# Patient Record
Sex: FEMALE | Race: WHITE | NOT HISPANIC OR LATINO | Employment: UNEMPLOYED | URBAN - METROPOLITAN AREA
[De-identification: names, ages, dates, MRNs, and addresses within clinical notes are randomized per-mention and may not be internally consistent; named-entity substitution may affect disease eponyms.]

---

## 2023-02-23 LAB
ABO GROUP (TYPE) IN BLOOD: NORMAL
ANTIBODY SCREEN: NORMAL
ESTIMATED AVERAGE GLUCOSE FOR HBA1C: NORMAL
HEMOGLOBIN A1C/HEMOGLOBIN TOTAL IN BLOOD: NORMAL
HEPATITIS B VIRUS SURFACE AG PRESENCE IN SERUM: NONREACTIVE
HEPATITIS C VIRUS AB PRESENCE IN SERUM: NONREACTIVE
HGB A1C: NORMAL %
HIV 1/ 2 AG/AB SCREEN: NONREACTIVE
RH FACTOR: NORMAL
RUBELLA VIRUS IGG AB: NORMAL
SYPHILIS TOTAL AB: NONREACTIVE
THYROTROPIN (MIU/L) IN SER/PLAS BY DETECTION LIMIT <= 0.05 MIU/L: 3.65 MIU/L (ref 0.44–3.98)
VARICELLA ZOSTER IGG: NEGATIVE

## 2023-02-24 LAB
CHLAMYDIA TRACH., AMPLIFIED: NEGATIVE
N. GONORRHEA, AMPLIFIED: NEGATIVE

## 2023-03-01 LAB
ESTIMATED AVERAGE GLUCOSE FOR HBA1C: 100 MG/DL
HEMOGLOBIN A1C/HEMOGLOBIN TOTAL IN BLOOD: 5.1 %

## 2023-03-14 LAB
ABO GROUP (TYPE) IN BLOOD: NORMAL
ANTIBODY SCREEN: NORMAL
CHORIOGONADOTROPIN (MIU/ML) IN SER/PLAS: <3 IU/L
ERYTHROCYTE DISTRIBUTION WIDTH (RATIO) BY AUTOMATED COUNT: 14.1 % (ref 11.5–14.5)
ERYTHROCYTE MEAN CORPUSCULAR HEMOGLOBIN CONCENTRATION (G/DL) BY AUTOMATED: 30.2 G/DL (ref 32–36)
ERYTHROCYTE MEAN CORPUSCULAR VOLUME (FL) BY AUTOMATED COUNT: 77 FL (ref 80–100)
ERYTHROCYTES (10*6/UL) IN BLOOD BY AUTOMATED COUNT: 4.91 X10E12/L (ref 4–5.2)
HEMATOCRIT (%) IN BLOOD BY AUTOMATED COUNT: 37.7 % (ref 36–46)
HEMOGLOBIN (G/DL) IN BLOOD: 11.4 G/DL (ref 12–16)
LEUKOCYTES (10*3/UL) IN BLOOD BY AUTOMATED COUNT: 4.8 X10E9/L (ref 4.4–11.3)
NRBC (PER 100 WBCS) BY AUTOMATED COUNT: 0 /100 WBC (ref 0–0)
PLATELETS (10*3/UL) IN BLOOD AUTOMATED COUNT: 184 X10E9/L (ref 150–450)
RH FACTOR: NORMAL

## 2023-04-18 LAB
ABO GROUP (TYPE) IN BLOOD: NORMAL
ANTIBODY SCREEN: NORMAL
CHORIOGONADOTROPIN (MIU/ML) IN SER/PLAS: <3 IU/L
ERYTHROCYTE DISTRIBUTION WIDTH (RATIO) BY AUTOMATED COUNT: 13.9 % (ref 11.5–14.5)
ERYTHROCYTE MEAN CORPUSCULAR HEMOGLOBIN CONCENTRATION (G/DL) BY AUTOMATED: 31 G/DL (ref 32–36)
ERYTHROCYTE MEAN CORPUSCULAR VOLUME (FL) BY AUTOMATED COUNT: 77 FL (ref 80–100)
ERYTHROCYTES (10*6/UL) IN BLOOD BY AUTOMATED COUNT: 5.09 X10E12/L (ref 4–5.2)
HEMATOCRIT (%) IN BLOOD BY AUTOMATED COUNT: 39 % (ref 36–46)
HEMOGLOBIN (G/DL) IN BLOOD: 12.1 G/DL (ref 12–16)
LEUKOCYTES (10*3/UL) IN BLOOD BY AUTOMATED COUNT: 4.4 X10E9/L (ref 4.4–11.3)
NRBC (PER 100 WBCS) BY AUTOMATED COUNT: 0 /100 WBC (ref 0–0)
PLATELETS (10*3/UL) IN BLOOD AUTOMATED COUNT: 201 X10E9/L (ref 150–450)
RH FACTOR: NORMAL

## 2023-04-20 ENCOUNTER — HOSPITAL ENCOUNTER (OUTPATIENT)
Dept: DATA CONVERSION | Facility: HOSPITAL | Age: 28
End: 2023-04-20
Attending: OBSTETRICS & GYNECOLOGY | Admitting: OBSTETRICS & GYNECOLOGY
Payer: COMMERCIAL

## 2023-04-20 DIAGNOSIS — Z90.710 ACQUIRED ABSENCE OF BOTH CERVIX AND UTERUS: ICD-10-CM

## 2023-04-20 DIAGNOSIS — Z87.891 PERSONAL HISTORY OF NICOTINE DEPENDENCE: ICD-10-CM

## 2023-04-20 DIAGNOSIS — N70.11 CHRONIC SALPINGITIS: ICD-10-CM

## 2023-04-20 DIAGNOSIS — Z88.0 ALLERGY STATUS TO PENICILLIN: ICD-10-CM

## 2023-05-08 LAB
THYROPEROXIDASE AB (IU/ML) IN SER/PLAS: <28 IU/ML
THYROTROPIN (MIU/L) IN SER/PLAS BY DETECTION LIMIT <= 0.05 MIU/L: 1.18 MIU/L (ref 0.44–3.98)

## 2023-05-26 LAB
COMPLETE PATHOLOGY REPORT: NORMAL
CONVERTED CLINICAL DIAGNOSIS-HISTORY: NORMAL
CONVERTED FINAL DIAGNOSIS: NORMAL
CONVERTED FINAL REPORT PDF LINK TO COPY AND PASTE: NORMAL
CONVERTED GROSS DESCRIPTION: NORMAL

## 2023-06-02 LAB
ESTRADIOL (PG/ML) IN SER/PLAS: 352 PG/ML
PROGESTERONE (NG/ML) IN SER/PLAS: 0.9 NG/ML

## 2023-06-09 LAB — PROGESTERONE (NG/ML) IN SER/PLAS: 17.3 NG/ML

## 2023-06-19 LAB — CHORIOGONADOTROPIN (MIU/ML) IN SER/PLAS: 364 IU/L

## 2023-06-21 LAB — CHORIOGONADOTROPIN (MIU/ML) IN SER/PLAS: 857 IU/L

## 2023-06-28 LAB — CHORIOGONADOTROPIN (MIU/ML) IN SER/PLAS: ABNORMAL IU/L

## 2023-06-29 LAB — THYROTROPIN (MIU/L) IN SER/PLAS BY DETECTION LIMIT <= 0.05 MIU/L: 2.13 MIU/L (ref 0.44–3.98)

## 2023-07-20 LAB
ABO GROUP (TYPE) IN BLOOD: NORMAL
ANTIBODY SCREEN: NORMAL
ERYTHROCYTE DISTRIBUTION WIDTH (RATIO) BY AUTOMATED COUNT: 14.7 % (ref 11.5–14.5)
ERYTHROCYTE MEAN CORPUSCULAR HEMOGLOBIN CONCENTRATION (G/DL) BY AUTOMATED: 31.6 G/DL (ref 32–36)
ERYTHROCYTE MEAN CORPUSCULAR VOLUME (FL) BY AUTOMATED COUNT: 79 FL (ref 80–100)
ERYTHROCYTES (10*6/UL) IN BLOOD BY AUTOMATED COUNT: 4.98 X10E12/L (ref 4–5.2)
HEMATOCRIT (%) IN BLOOD BY AUTOMATED COUNT: 39.5 % (ref 36–46)
HEMOGLOBIN (G/DL) IN BLOOD: 12.5 G/DL (ref 12–16)
LEUKOCYTES (10*3/UL) IN BLOOD BY AUTOMATED COUNT: 7.8 X10E9/L (ref 4.4–11.3)
PLATELETS (10*3/UL) IN BLOOD AUTOMATED COUNT: 199 X10E9/L (ref 150–450)
REFLEX ADDED, ANEMIA PANEL: ABNORMAL
RH FACTOR: NORMAL

## 2023-07-21 LAB
ESTIMATED AVERAGE GLUCOSE FOR HBA1C: 94 MG/DL
HEMOGLOBIN A1C/HEMOGLOBIN TOTAL IN BLOOD: 4.9 %
HEPATITIS B VIRUS SURFACE AG PRESENCE IN SERUM: NONREACTIVE
HEPATITIS C VIRUS AB PRESENCE IN SERUM: NONREACTIVE
HIV 1/ 2 AG/AB SCREEN: NONREACTIVE
RUBELLA VIRUS IGG AB: NORMAL
SYPHILIS TOTAL AB: NONREACTIVE

## 2023-08-03 LAB
ALANINE AMINOTRANSFERASE (SGPT) (U/L) IN SER/PLAS: 36 U/L (ref 7–45)
ASPARTATE AMINOTRANSFERASE (SGOT) (U/L) IN SER/PLAS: 19 U/L (ref 9–39)
CREATININE (MG/DL) IN SER/PLAS: 0.6 MG/DL (ref 0.5–1.05)
ERYTHROCYTE DISTRIBUTION WIDTH (RATIO) BY AUTOMATED COUNT: 14.8 % (ref 11.5–14.5)
ERYTHROCYTE MEAN CORPUSCULAR HEMOGLOBIN CONCENTRATION (G/DL) BY AUTOMATED: 31.6 G/DL (ref 32–36)
ERYTHROCYTE MEAN CORPUSCULAR VOLUME (FL) BY AUTOMATED COUNT: 79 FL (ref 80–100)
ERYTHROCYTES (10*6/UL) IN BLOOD BY AUTOMATED COUNT: 4.99 X10E12/L (ref 4–5.2)
GFR FEMALE: >90 ML/MIN/1.73M2
HEMATOCRIT (%) IN BLOOD BY AUTOMATED COUNT: 39.5 % (ref 36–46)
HEMOGLOBIN (G/DL) IN BLOOD: 12.5 G/DL (ref 12–16)
LACTATE DEHYDROGENASE (U/L) IN SER/PLAS BY LAC->PYR RXN: 215 U/L (ref 84–246)
LEUKOCYTES (10*3/UL) IN BLOOD BY AUTOMATED COUNT: 6.4 X10E9/L (ref 4.4–11.3)
PLATELETS (10*3/UL) IN BLOOD AUTOMATED COUNT: 183 X10E9/L (ref 150–450)
URATE (MG/DL) IN SER/PLAS: 3.8 MG/DL (ref 2.3–6.7)

## 2023-08-04 LAB
CREATININE (MG/DL) IN URINE: 214 MG/DL (ref 20–320)
PROTEIN (MG/DL) IN URINE: 15 MG/DL (ref 5–24)
PROTEIN/CREATININE (MG/MG) IN URINE: 0.07 MG/MG CREAT (ref 0–0.17)

## 2023-08-05 LAB
CHLAMYDIA TRACH., AMPLIFIED: NEGATIVE
N. GONORRHEA, AMPLIFIED: NEGATIVE

## 2023-08-06 LAB — URINE CULTURE: NO GROWTH

## 2023-09-07 VITALS — HEIGHT: 63 IN | BODY MASS INDEX: 31.05 KG/M2 | WEIGHT: 175.27 LBS

## 2023-10-02 NOTE — OP NOTE
Post Operative Note:     PreOp Diagnosis: bilateral hydrosalpinges   Post-Procedure Diagnosis: same   Procedure: 1. laparoscopic bilateral salpingectomy,  removal of bilateral hydrosalpinges  2. Hysteroscopy   Surgeon: Dr. Phillip   Resident/Fellow/Other Assistant: [email or web address]; Ara  PGY4; Saulo MS3   Anesthesia: general   I.V. Fluids: 800   Estimated Blood Loss (mL): 10   Specimen: yes. bilateral fallopian tubes   Complications: none   Findings: abnormal appearing inflamed bilateral fallopian  tubes. Normal appearing endometrial cavity. Normal appearing liver edge and appendix   Patient Returned To/Condition: Pacu / Stable   Urine Output: 500     Operative Report Dictated:  Dictation: not applicable - note contains Operative  Report   Operative Report:    SURGICAL DETAILS  The patient was taken to the operating room where general anesthesia was found be adequate. She was prepped and draped in a sterile fashion after placement in the dorsal lithotomy position. A Frye catheter was inserted into the patient's bladder and  a SG uterine manipulator was inserted. Attention was then turned to the patient's abdomen.     A small infraumbilical skin incision was made with a scalpel after injection with 0.25% Marcaine. Following this, the 5-mm optical trocar was used to introduce the laparoscope. Two additional 5mm laparoscopic lower lateral  trocars were then introduced  under direct visualization with no complications. Following this, an intra-abdominal survey was performed with the findings noted above.     The left tube and ovary appeared adherent to the left pelvic sidewall and partially retroperitonealized, Left fallopian tube appeared dilated and inflamed. The ovary appeared involved with small amount of filmy adhesions. The right fallopian tube appeared  inflamed and dilated. The cul-de-sac was free of disease. There was a normal-appearing uterus.    The LigaSure was used to carefully remove the left and  right fallopian tubes. Which were then removed through the trocar sites. The abdomen was irrigated and excellent hemostasis was confirmed.    The abdomen was desufflated and the skin incisions were repaired using 4-0 vicryl. Dermabond was applied.     At this time we proceeded with hysteroscopy:     A speculum was placed into the vagina and the cervix was visualized. The anterior lip of the cervix was grasped with a single-tooth tenaculum. The  cervical os was then serially dilated to accommodate the operative hysteroscope, which was placed through the cervical canal into the uterus. The ostia were visualized bilaterally.     A survey of the uterine cavity was performed and was notable for normal appearing endometrial cavity.    The hysteroscope and the single-tooth tenaculum were removed. Excellent hemostasis was achieved and the speculum was removed.    The patient tolerated the procedure well. All sponge, lap, and needle counts were correct x2 at the end of procedure. She returned to the recovery room in stable condition. She was expected to go home later today.    Dr. Phillip, the attending of record, was scrubbed and performed the entire procedure with the assistance of the resident/fellow.     Attestation:   Note Completion:  I am a: Resident/Fellow   Attending Attestation I was present for the entire procedure          Electronic Signatures:  Fred Phillip)  (Signed 04-May-2023 17:11)   Authored: Post Operative Note, Note Completion   Co-Signer: Post Operative Note, Note Completion  Jenn Rodriges (Fellow))  (Signed 20-Apr-2023 13:14)   Authored: Post Operative Note, Note Completion      Last Updated: 04-May-2023 17:11 by Fred Phillip)

## 2023-10-03 ENCOUNTER — ANCILLARY PROCEDURE (OUTPATIENT)
Dept: RADIOLOGY | Facility: CLINIC | Age: 28
End: 2023-10-03
Payer: COMMERCIAL

## 2023-10-03 DIAGNOSIS — O44.20: ICD-10-CM

## 2023-10-03 DIAGNOSIS — Z34.90 PREGNANT (HHS-HCC): ICD-10-CM

## 2023-10-03 PROCEDURE — 76817 TRANSVAGINAL US OBSTETRIC: CPT | Performed by: OBSTETRICS & GYNECOLOGY

## 2023-10-03 PROCEDURE — 76811 OB US DETAILED SNGL FETUS: CPT

## 2023-10-03 PROCEDURE — 76811 OB US DETAILED SNGL FETUS: CPT | Performed by: OBSTETRICS & GYNECOLOGY

## 2023-10-03 PROCEDURE — 76817 TRANSVAGINAL US OBSTETRIC: CPT

## 2023-10-13 VITALS — DIASTOLIC BLOOD PRESSURE: 68 MMHG | BODY MASS INDEX: 29.58 KG/M2 | SYSTOLIC BLOOD PRESSURE: 108 MMHG | WEIGHT: 167 LBS

## 2023-10-13 PROBLEM — N98.1 OVARIAN HYPERSTIMULATION SYNDROME: Status: ACTIVE | Noted: 2023-10-13

## 2023-10-13 PROBLEM — L30.9 DERMATITIS, UNSPECIFIED: Status: ACTIVE | Noted: 2023-04-06

## 2023-10-13 PROBLEM — L85.3 XEROSIS CUTIS: Status: ACTIVE | Noted: 2023-04-06

## 2023-10-13 PROBLEM — O24.410: Status: ACTIVE | Noted: 2023-10-13

## 2023-10-13 PROBLEM — E55.9 VITAMIN D DEFICIENCY: Status: ACTIVE | Noted: 2023-10-13

## 2023-10-13 PROBLEM — R11.2 NAUSEA AND VOMITING: Status: ACTIVE | Noted: 2023-10-13

## 2023-10-13 PROBLEM — N63.15 BREAST LUMP ON RIGHT SIDE AT 9 O'CLOCK POSITION: Status: ACTIVE | Noted: 2023-10-13

## 2023-10-13 PROBLEM — L01.09 OTHER IMPETIGO: Status: ACTIVE | Noted: 2023-04-06

## 2023-10-13 PROBLEM — N97.1: Status: ACTIVE | Noted: 2023-10-13

## 2023-10-13 PROBLEM — K21.9 GERD (GASTROESOPHAGEAL REFLUX DISEASE): Status: ACTIVE | Noted: 2023-10-13

## 2023-10-13 PROBLEM — N97.9 FEMALE INFERTILITY: Status: ACTIVE | Noted: 2023-10-13

## 2023-10-13 RX ORDER — LEVOTHYROXINE SODIUM 25 UG/1
25 TABLET ORAL DAILY
COMMUNITY

## 2023-10-13 NOTE — PROGRESS NOTES
having some cramping    ENOB: Patient feeling well overall. Reports nausea which is manageable. No vomiting. Reports some pelvic cramping, no vaginal bleeding.   IVF pregnancy. ET on 06/09/2023. Given JACINTO 02/25/2024.   Prenatal labs completed. Rubella equivocal, discussed MMR during postpartum. Labs otherwise normal.   Pap is up to date. Last pap was in 02/2021 and was negative. GC/CT and urine cultures today.  Genetic screening options discussed, patient desires NIPT and first trimester US.    H/O GDM: Early HgbA1C was normal.   H/O GHTN: Early PEC labs, UPCr today.  US: S=D, +FCA and +FM.     Routine prenatal care.   Oriented to practice, delivery at [facility], rotating appointments with providers.  GC/CT and urine cultures collected.  PEC labs, UPCr, Prequel ordered.  MAC imaging for first screen ordered.   Precautions given. -Tonia Owen 08/03/2023 12:21 PM

## 2023-10-26 ENCOUNTER — ROUTINE PRENATAL (OUTPATIENT)
Dept: OBSTETRICS AND GYNECOLOGY | Facility: CLINIC | Age: 28
End: 2023-10-26
Payer: COMMERCIAL

## 2023-10-26 VITALS — DIASTOLIC BLOOD PRESSURE: 62 MMHG | WEIGHT: 168 LBS | SYSTOLIC BLOOD PRESSURE: 100 MMHG | BODY MASS INDEX: 29.76 KG/M2

## 2023-10-26 DIAGNOSIS — Z86.32 HISTORY OF GESTATIONAL DIABETES: ICD-10-CM

## 2023-10-26 DIAGNOSIS — Z34.82 PRENATAL CARE, SUBSEQUENT PREGNANCY, SECOND TRIMESTER (HHS-HCC): Primary | ICD-10-CM

## 2023-10-26 DIAGNOSIS — Z87.59 HISTORY OF GESTATIONAL HYPERTENSION: ICD-10-CM

## 2023-10-26 DIAGNOSIS — Z78.9 CONCEIVED BY IN VITRO FERTILIZATION: ICD-10-CM

## 2023-10-26 DIAGNOSIS — Z72.0 TOBACCO USE: ICD-10-CM

## 2023-10-26 LAB
POC APPEARANCE, URINE: CLEAR
POC BILIRUBIN, URINE: NEGATIVE
POC BLOOD, URINE: NEGATIVE
POC COLOR, URINE: YELLOW
POC GLUCOSE, URINE: NEGATIVE MG/DL
POC KETONES, URINE: NEGATIVE MG/DL
POC LEUKOCYTES, URINE: NEGATIVE
POC NITRITE,URINE: NEGATIVE
POC PH, URINE: 7 PH
POC PROTEIN, URINE: NEGATIVE MG/DL
POC SPECIFIC GRAVITY, URINE: 1.01
POC UROBILINOGEN, URINE: 0.2 EU/DL

## 2023-10-26 PROCEDURE — 81003 URINALYSIS AUTO W/O SCOPE: CPT | Performed by: OBSTETRICS & GYNECOLOGY

## 2023-10-26 PROCEDURE — 0501F PRENATAL FLOW SHEET: CPT | Performed by: OBSTETRICS & GYNECOLOGY

## 2023-10-26 NOTE — PATIENT INSTRUCTIONS
You were seen in the office today for routine OB care and had normal findings on exam  Continue routine OB precautions at home  Continue taking prenatal vitamins   Avoid sick contacts and consider getting your Flu (available in office during flu season) and COVID vaccines to protect against infection in pregnancy  You will be given an order for your anatomy ultrasound. Please schedule at [facility] OB imaging between 19 and 22 weeks gestation [phone]  You will be given a bottle of Glucola and orders for your second trimester labs. Please complete these between 25 and 28 weeks gestation. You may complete these at any  outpatient lab, and do not need an appointment  Make an appointment for routine care in the office in the next 4 weeks  If you are having any concerns prior to your next visit please call the office to speak to the physician on call. This includes after hours, weekends, and holidays, when the answering service will be able to connect you with the physician on call. [phone] ([facility]) or [phone] [facility].

## 2023-10-26 NOTE — PROGRESS NOTES
Subjective   Patient ID [id number]   Vonda Francis is a 28 y.o.  at 22w4d with a working estimated date of delivery of 2024, by Ultrasound who presents for a routine prenatal visit. She denies vaginal bleeding, leakage of fluid, decreased fetal movements, or contractions.    Her pregnancy is complicated by:  IVF conception  Hx GHTN  Hx of GDM  Low lying placenta    Objective   Physical Exam  Weight: 76.2 kg (168 lb)  Expected Total Weight Gain: 5 kg (11 lb)-9 kg (19 lb)   Pregravid BMI: 30.12  BP: 100/62         Prenatal Labs  Urine dip:  Lab Results   Component Value Date    KETONESU NEGATIVE 2019       Lab Results   Component Value Date    HGB 12.5 2023    HCT 39.5 2023    ABO CANCELED 2023    ABO O 2023    HEPBSAG NONREACTIVE 2023       Imaging: reviewed recent anatomy US          Assessment/Plan   Problem List Items Addressed This Visit    None  Visit Diagnoses         Codes    Prenatal care, subsequent pregnancy, second trimester    -  Primary Z34.82    Patient doing well  Second trimester orders given  RTO 4 weeks     Relevant Orders    POC Urine Dip    Glucose, 1 Hour Screen, Pregnancy    CBC Anemia Panel With Reflex, Pregnancy    Tobacco use     Z72.0    Quit     History of gestational diabetes     Z86.32    Normal baseline A1c     History of gestational hypertension     Z87.59    Normal HELLP labs/UPCr. Patient on ASA     Conceived by in vitro fertilization     Z78.9    Serial growth US  NST at 36 weeks             Continue prenatal vitamin.  Labs reviewed.  Rhogam not indicated  GTT orders given.    Follow up in 4 weeks for a routine prenatal visit.  Eneida Joe DO

## 2023-10-27 ENCOUNTER — ROUTINE PRENATAL (OUTPATIENT)
Dept: OBSTETRICS AND GYNECOLOGY | Facility: CLINIC | Age: 28
End: 2023-10-27
Payer: COMMERCIAL

## 2023-10-27 VITALS — WEIGHT: 169 LBS | DIASTOLIC BLOOD PRESSURE: 70 MMHG | SYSTOLIC BLOOD PRESSURE: 100 MMHG | BODY MASS INDEX: 29.94 KG/M2

## 2023-10-27 DIAGNOSIS — O46.8X2 SUBCHORIONIC HEMATOMA IN SECOND TRIMESTER, SINGLE OR UNSPECIFIED FETUS (HHS-HCC): Primary | ICD-10-CM

## 2023-10-27 DIAGNOSIS — Z3A.22 22 WEEKS GESTATION OF PREGNANCY (HHS-HCC): ICD-10-CM

## 2023-10-27 DIAGNOSIS — O44.02 PLACENTA PREVIA IN SECOND TRIMESTER (HHS-HCC): ICD-10-CM

## 2023-10-27 DIAGNOSIS — O41.8X20 SUBCHORIONIC HEMATOMA IN SECOND TRIMESTER, SINGLE OR UNSPECIFIED FETUS (HHS-HCC): Primary | ICD-10-CM

## 2023-10-27 LAB
POC APPEARANCE, URINE: ABNORMAL
POC BILIRUBIN, URINE: NEGATIVE
POC BLOOD, URINE: ABNORMAL
POC COLOR, URINE: ABNORMAL
POC GLUCOSE, URINE: NEGATIVE MG/DL
POC KETONES, URINE: NEGATIVE MG/DL
POC LEUKOCYTES, URINE: NEGATIVE
POC NITRITE,URINE: NEGATIVE
POC PH, URINE: 5 PH
POC PROTEIN, URINE: ABNORMAL MG/DL
POC SPECIFIC GRAVITY, URINE: 1.02
POC UROBILINOGEN, URINE: 0.2 EU/DL

## 2023-10-27 PROCEDURE — 99213 OFFICE O/P EST LOW 20 MIN: CPT | Performed by: OBSTETRICS & GYNECOLOGY

## 2023-10-27 PROCEDURE — 76815 OB US LIMITED FETUS(S): CPT | Performed by: OBSTETRICS & GYNECOLOGY

## 2023-10-27 PROCEDURE — 81003 URINALYSIS AUTO W/O SCOPE: CPT | Performed by: OBSTETRICS & GYNECOLOGY

## 2023-10-27 NOTE — PROGRESS NOTES
Subjective   Patient ID [id number]   Vonda Francis is a 28 y.o.  at 22w5d with a working estimated date of delivery of 2024, by Ultrasound who presents for complaint of vaginal bleeding. She states at 0900 today she was walking this morning and noted a sudden wetness in her underwear. She went to the restroom and noted dark red blood in her underwear. She states since that time the bleeding has stopped. She has not felt movement today. She denies vaginal or abdominal pain, notes occasional mild cramping last night. She denies falls/trauma. She has not had bleeding prior to this pregnancy.    Her pregnancy is complicated by:  IVF conception  Hx GDM  Hx GHTN  Low lying placenta on US    Objective   Physical Exam  Weight: 76.7 kg (169 lb)  Expected Total Weight Gain: 5 kg (11 lb)-9 kg (19 lb)   Pregravid BMI: 30.12  BP: 100/70         Prenatal Labs  Urine dip:  Lab Results   Component Value Date    KETONESU NEGATIVE 10/27/2023       Lab Results   Component Value Date    HGB 12.5 2023    HCT 39.5 2023    ABO CANCELED 2023    ABO O 2023    HEPBSAG NONREACTIVE 2023       Imaging:  CL 4 cm, 2.45 cm x 3.48 cm subchorionic hematoma along inferior placental edge.    Assessment/Plan   Problem List Items Addressed This Visit    None  Visit Diagnoses         Codes    Subchorionic hematoma in second trimester, single or unspecified fetus    -  Primary O41.8X20, O46.8X2    3.48 cm x 2.45 cm at 22w5d   Pelvic rest, no heavy lifting  Rhogam not indicated  Repeat US at [facility] 1 wk     22 weeks gestation of pregnancy     Z3A.22    Relevant Orders    POC Urine Dip (Completed)    Placenta previa in second trimester     O44.02    Pelvic rest, reassess 30 weeks for resolution     Relevant Orders    Initiate Request to another  Facility or Exempt Unit ([facility], [facility])         Spoke with Corrina through [facility]. Earliest [facility] for steve-viability. Offered patient  admission for observation at [facility] versus home monitoring with plan to be admitted at [facility] if further bleeding and/or pelvic pain. Patient opted for home monitoring. Strict precautions given. Will not give BTMS at this time. On call doc for weekend notified of status.    Continue prenatal vitamin.  Labs reviewed.  Rhogam not indicated, Rh+  GTT 25-26 weeks GA.    Follow up in 3 weeks for a routine prenatal visit.  Eneida Joe,

## 2023-10-27 NOTE — PATIENT INSTRUCTIONS
Bleeding in Early Pregnancy:  You were seen in the office today for bleeding in early pregnancy with findings of subchorionic hematoma at inferior edge of anterior placenta previa on exam  Good fetal cardiac activity with heart rate in the 150's was visualized on ultrasound. Your cervix was examined and is closed and normal length on US.  Light bleeding in early pregnancy is not uncommon and can be due to implantation bleeding, cervical irritation, or bleeding behind the early placenta  Miscarriage is more commonly associated with heavy bleeding and pelvic pain  Continue routine OB precautions at home and continue all upcoming visits in the office  We would advise pelvic rest (no intercourse) until after the bleeding has resolved, all other activities are fine at this time  If your blood type is unknown, labs were ordered to determine blood type. This is important because in cases of bleeding in pregnancy, women with certain blood types may require additional medication to protect against complications. If you are known to have Rh negative blood (A-, AB-, O-, B-) then you should have received Rhogam in the office today. You will need an additional dose of Rhogam for any subsequent bleeding that occurs 12 or more weeks after the first dose of Rhogam, and/or in the third trimester at 28 weeks gestation.  Please call the office for any heavy bleeding, sharp pelvic pain, or passage of tissue. [phone] ([facility]) or [phone] ([place])

## 2023-10-30 ENCOUNTER — TELEPHONE (OUTPATIENT)
Dept: OBSTETRICS AND GYNECOLOGY | Facility: CLINIC | Age: 28
End: 2023-10-30
Payer: COMMERCIAL

## 2023-10-30 NOTE — TELEPHONE ENCOUNTER
Returned call and spoke to patient. She has apt for US at [facility] tomorrow. Will have her make an appointment in our office with me on Thursday in [place] for follow up. Precautions given  DH

## 2023-10-31 ENCOUNTER — ANCILLARY PROCEDURE (OUTPATIENT)
Dept: RADIOLOGY | Facility: CLINIC | Age: 28
End: 2023-10-31
Payer: COMMERCIAL

## 2023-10-31 DIAGNOSIS — Z34.90 PREGNANT (HHS-HCC): ICD-10-CM

## 2023-10-31 DIAGNOSIS — R58 BLEEDING: ICD-10-CM

## 2023-10-31 PROCEDURE — 76816 OB US FOLLOW-UP PER FETUS: CPT

## 2023-10-31 PROCEDURE — 76817 TRANSVAGINAL US OBSTETRIC: CPT

## 2023-10-31 PROCEDURE — 76817 TRANSVAGINAL US OBSTETRIC: CPT | Performed by: OBSTETRICS & GYNECOLOGY

## 2023-10-31 PROCEDURE — 76815 OB US LIMITED FETUS(S): CPT | Performed by: OBSTETRICS & GYNECOLOGY

## 2023-11-02 ENCOUNTER — ROUTINE PRENATAL (OUTPATIENT)
Dept: OBSTETRICS AND GYNECOLOGY | Facility: CLINIC | Age: 28
End: 2023-11-02
Payer: COMMERCIAL

## 2023-11-02 DIAGNOSIS — O46.8X2 SUBCHORIONIC HEMATOMA IN SECOND TRIMESTER, FETUS 1 OF MULTIPLE GESTATION (HHS-HCC): ICD-10-CM

## 2023-11-02 DIAGNOSIS — O41.8X21 SUBCHORIONIC HEMATOMA IN SECOND TRIMESTER, FETUS 1 OF MULTIPLE GESTATION (HHS-HCC): ICD-10-CM

## 2023-11-02 DIAGNOSIS — Z34.82 PRENATAL CARE, SUBSEQUENT PREGNANCY, SECOND TRIMESTER (HHS-HCC): Primary | ICD-10-CM

## 2023-11-02 LAB
POC APPEARANCE, URINE: CLEAR
POC BILIRUBIN, URINE: NEGATIVE
POC BLOOD, URINE: NEGATIVE
POC COLOR, URINE: YELLOW
POC GLUCOSE, URINE: NEGATIVE MG/DL
POC KETONES, URINE: NEGATIVE MG/DL
POC LEUKOCYTES, URINE: NEGATIVE
POC NITRITE,URINE: NEGATIVE
POC PH, URINE: 6 PH
POC PROTEIN, URINE: NEGATIVE MG/DL
POC SPECIFIC GRAVITY, URINE: 1.01
POC UROBILINOGEN, URINE: 0.2 EU/DL

## 2023-11-02 PROCEDURE — 81003 URINALYSIS AUTO W/O SCOPE: CPT | Performed by: OBSTETRICS & GYNECOLOGY

## 2023-11-02 PROCEDURE — 0501F PRENATAL FLOW SHEET: CPT | Performed by: OBSTETRICS & GYNECOLOGY

## 2023-11-02 NOTE — PATIENT INSTRUCTIONS
You were seen in the office today for routine OB care and had normal findings on exam  Continue routine OB precautions at home  Continue taking prenatal vitamins   Avoid sick contacts and consider getting your Flu (available in office during flu season) and COVID vaccines to protect against infection in pregnancy  You will be given an order for your anatomy ultrasound. Please schedule at [facility] OB imaging between 19 and 22 weeks gestation [phone]  You will be given a bottle of Glucola and orders for your second trimester labs. Please complete these between 25 and 28 weeks gestation. You may complete these at any  outpatient lab, and do not need an appointment  Make an appointment for routine care in the office in the next 4 weeks  If you are having any concerns prior to your next visit please call the office to speak to the physician on call. This includes after hours, weekends, and holidays, when the answering service will be able to connect you with the physician on call. [phone] ([place] Office) or [phone] [facility].

## 2023-11-02 NOTE — PROGRESS NOTES
Subjective   Patient ID [id number]   Vonda Francis is a 28 y.o.  at 23w4d with a working estimated date of delivery of 2024, by Ultrasound who presents for a routine prenatal visit. She denies vaginal bleeding, leakage of fluid, decreased fetal movements, or contractions.    Her pregnancy is complicated by:  IVF conception  Subchorionic bleed  Hx GDM  Hx GHTN  Low lying placenta    Objective   Physical Exam     Expected Total Weight Gain: 5 kg (11 lb)-9 kg (19 lb)   Pregravid BMI: 30.12            Prenatal Labs  Urine dip:  Lab Results   Component Value Date    KETONESU NEGATIVE 10/27/2023       Lab Results   Component Value Date    HGB 12.5 2023    HCT 39.5 2023    ABO CANCELED 2023    ABO O 2023    HEPBSAG NONREACTIVE 2023           Imaging: Appropriate growth, resolution of subchorionic hematoma     Assessment/Plan   Problem List Items Addressed This Visit    None  Visit Diagnoses         Codes    Prenatal care, subsequent pregnancy, second trimester    -  Primary Z34.82    Relevant Orders    POC Urine Dip    Glucose, 1 Hour Screen, Pregnancy    CBC Anemia Panel With Reflex, Pregnancy    Subchorionic hematoma in second trimester, fetus 1 of multiple gestation     O41.8X21, O46.8X2    Resolved at this time, plan for growth US             Continue prenatal vitamin.  Labs reviewed.  Rhogam not indicated  GTT ordered.    Follow up in 4 weeks for a routine prenatal visit.  Eneida Joe DO

## 2023-11-22 ENCOUNTER — APPOINTMENT (OUTPATIENT)
Dept: OBSTETRICS AND GYNECOLOGY | Facility: CLINIC | Age: 28
End: 2023-11-22
Payer: COMMERCIAL

## 2023-11-28 ENCOUNTER — ANCILLARY PROCEDURE (OUTPATIENT)
Dept: RADIOLOGY | Facility: CLINIC | Age: 28
End: 2023-11-28
Payer: COMMERCIAL

## 2023-11-28 DIAGNOSIS — Z36.4 ULTRASOUND FOR ANTENATAL SCREENING FOR FETAL GROWTH RESTRICTION (HHS-HCC): ICD-10-CM

## 2023-11-28 PROCEDURE — 76816 OB US FOLLOW-UP PER FETUS: CPT | Performed by: OBSTETRICS & GYNECOLOGY

## 2023-11-28 PROCEDURE — 76819 FETAL BIOPHYS PROFIL W/O NST: CPT | Performed by: OBSTETRICS & GYNECOLOGY

## 2023-11-28 PROCEDURE — 76816 OB US FOLLOW-UP PER FETUS: CPT

## 2023-11-28 PROCEDURE — 76817 TRANSVAGINAL US OBSTETRIC: CPT | Performed by: OBSTETRICS & GYNECOLOGY

## 2023-11-28 PROCEDURE — 76817 TRANSVAGINAL US OBSTETRIC: CPT

## 2023-12-06 ENCOUNTER — APPOINTMENT (OUTPATIENT)
Dept: RADIOLOGY | Facility: CLINIC | Age: 28
End: 2023-12-06
Payer: COMMERCIAL

## 2023-12-11 ENCOUNTER — OFFICE VISIT (OUTPATIENT)
Dept: PEDIATRIC CARDIOLOGY | Facility: HOSPITAL | Age: 28
End: 2023-12-11
Payer: COMMERCIAL

## 2023-12-11 ENCOUNTER — HOSPITAL ENCOUNTER (OUTPATIENT)
Dept: PEDIATRIC CARDIOLOGY | Facility: HOSPITAL | Age: 28
Discharge: HOME | End: 2023-12-11
Payer: COMMERCIAL

## 2023-12-11 VITALS
HEART RATE: 98 BPM | SYSTOLIC BLOOD PRESSURE: 106 MMHG | DIASTOLIC BLOOD PRESSURE: 71 MMHG | OXYGEN SATURATION: 99 % | WEIGHT: 177.91 LBS | HEIGHT: 65 IN | BODY MASS INDEX: 29.64 KG/M2

## 2023-12-11 DIAGNOSIS — Z31.83 IN VITRO FERTILIZATION: ICD-10-CM

## 2023-12-11 DIAGNOSIS — O35.8XX0 MATERNAL CARE FOR OTHER (SUSPECTED) FETAL ABNORMALITY AND DAMAGE, NOT APPLICABLE OR UNSPECIFIED (HHS-HCC): ICD-10-CM

## 2023-12-11 DIAGNOSIS — O35.9XX0: Primary | ICD-10-CM

## 2023-12-11 PROCEDURE — 76827 ECHO EXAM OF FETAL HEART: CPT | Performed by: PEDIATRICS

## 2023-12-11 PROCEDURE — 93325 DOPPLER ECHO COLOR FLOW MAPG: CPT

## 2023-12-11 PROCEDURE — 3078F DIAST BP <80 MM HG: CPT | Performed by: PEDIATRICS

## 2023-12-11 PROCEDURE — 3074F SYST BP LT 130 MM HG: CPT | Performed by: PEDIATRICS

## 2023-12-11 PROCEDURE — 3044F HG A1C LEVEL LT 7.0%: CPT | Performed by: PEDIATRICS

## 2023-12-11 PROCEDURE — 99204 OFFICE O/P NEW MOD 45 MIN: CPT | Performed by: PEDIATRICS

## 2023-12-11 PROCEDURE — 99214 OFFICE O/P EST MOD 30 MIN: CPT | Performed by: PEDIATRICS

## 2023-12-11 PROCEDURE — 93325 DOPPLER ECHO COLOR FLOW MAPG: CPT | Performed by: PEDIATRICS

## 2023-12-11 NOTE — LETTER
2023     Eneida Joe,   [address]  [facility], [address] [address]    Patient: Vonda Francis   YOB: 1995   Date of Visit: 2023       Dear Dr. Eneida Joe, :    Thank you for referring Vonda Francis to me for evaluation. Below are my notes for this consultation.  If you have questions, please do not hesitate to call me. I look forward to following your patient along with you.       Sincerely,     Jane Vigil MD      CC: Kalyani Bauer, APRN-CNP  Yenifer Stapleton, ABDULAZIZ-ERA, DNP  ______________________________________________________________________________________         [facility]  [facility]  Division of Pediatric Cardiology  [facility]  [address] [place] [address], [address] [place] [address]  Tel: [phone], Fax [phone]     Obstetrician: Dr. Eneida Joe     Vonda Francis was seen at the request of Dr. Eneida Joe for pregnancy resulted from in vitro fertilization.  Records were reviewed, and a summary of those records is integrated within the history of present illness.  A report with my findings is being sent via written or electronic means to the referring physician with my recommendations    Accompanied by:     History obtained from:  Self    History of Presentation   History of Present Illness:   Vonda Francis is a 28 y.o. female presenting for initial prenatal cardiology consultation and fetal echocardiogram for pregnancy resulted from in vitro fertilization.  She is  and approximately 29w1d weeks pregnant (Last Menstrual Period 2023, Estimated Date of Delivery: 24) with a male fetus.  Her obstetric history is significant for one full term vaginal delivery.  Complications of her current pregnancy include in vitro fertilization  conception, class I obesity (BMI 30), history of gestational diabetes and gestational hypertension, subchorionic bleed and low lying placenta.  Ms. Vonda Francis had a normal first trimester screen.  Her level 2 obstetric ultrasound for anatomy was performed at 19 2/7  weeks gestational age and was normal.  She has  undergone cell free fetal DNA testing and it was risk reducing.  She has not had invasive genetic testing such as amniocentesis or chorionic villous sampling.  This pregnancy was not the result of in vitro fertilization.  She was not using potentially teratogenic medication at the time of conception.  There have been no other complications of this pregnancy.  Ms. Vonda Francis plans to deliver her baby at [facility].    Ms. Vonda Francis feels well today.  She denies any shortness of breath, abdominal cramping, contractions, bleeding, or swelling of the extremities.  She notes frequent fetal movement.        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    Current Outpatient Medications   Medication Instructions   • levothyroxine (SYNTHROID, LEVOXYL) 25 mcg, oral, Daily   • PRENATAL 2-IRON-FOLIC ACID-OM3 ORAL oral, Daily RT      Allergies:  Penicillins    Social History:  Patient lives with  her  and children .   Occupation: Stay at home mom  Smoking: None  Alcohol: None  Drug Use: None  She wears a seatbelt while in the car. She denies any verbal, sexual or physical abuse.  "    Cardiac Family History (for patient and father of baby):  There is no history of congenital heart disease.  There is no history of early sudden/unexplained death including SIDS and drowning.  There is no history of cardiomyopathy of any type or heart transplant.  There is no history of arrhythmias/pacemaker/defibrillator or arrhythmia syndromes, including Long QT syndrome, Oren-Parkinson-White syndrome or Brugada syndrome.  There is no history of heart attack or stroke before the age of 55 years in a close family member.  There is no history of Marfan syndrome or aortic aneurysm.  There is no history of deafness.  There is no history of syncope/fainting.  There is no history of high blood pressure or high cholesterol.  There is no history of DiGeorge Syndrome (22q11).    Physical Examination     /71 (BP Location: Right arm, Patient Position: Sitting, BP Cuff Size: Adult)   Pulse 98   Ht 1.65 m (5' 4.96\")   Wt 80.7 kg (177 lb 14.6 oz)   SpO2 99%   BMI 29.64 kg/m²     General: Alert, well-appearing and in no acute distress.    Abdomen: Soft, nontender, not distended. Gravid.  Extremities: No swelling or edema.  Neurologic / Psychiatric: Grossly intact without focal deficits.  Appropriate demeanor.      Results     Fetal Echocardiogram:    I ordered and interpreted a transabdominal fetal echocardiogram.  I performed a portion of the study myself.  The complete report is available under separate cover.  The results are summarized as follows:    Image quality: Good  Cardiac situs: Cardiac mass in the left chest.  Left ventricular apex points leftward.  Segmental anatomy: Atrio-ventricular concordance.  Ventriculo-arterial concordance.  Normally-related great arteries.  Superior vena cava: Normal connection to the right atrium.  Inferior vena cava: Normal connection to the right atrium.  Pulmonary veins: At least one pulmonary vein connects normally to the left atrium.  Atrial septum: Patent foramen ovale, " with flap valve bowing into the left atrium.  Tricuspid valve: Structurally normal.  No obvious stenosis or insufficiency.  Mitral valve: Structurally normal.  No obvious stenosis or insufficiency.  Right ventricle: Normal ventricular size, wall thickness, and systolic function (qualitative).  Left ventricle: Normal ventricular size, wall thickness, and systolic function (qualitative).  Interventricular septum: No obvious septal defect.  Aortic valve: Structurally normal.  No obvious stenosis or insufficiency.  Pulmonary valve: Structurally normal.  No obvious stenosis or insufficiency.  Pulmonary artery: Normal in size.  Ductal arch: Patent with right to left shunting.  Aortic arch: Left-sided.  Patent.  Pericardial effusion: None.  Umbilical arteries: Two umbilical arteries.  Normal arterial flow pattern.  Umbilical vein: Normal venous flow pattern.  Electrophysiology: Normal fetal heart rate and rhythm.  Normal mechanical OR interval.      Assessment & Plan   Assessment:  Ms. Vonda Francis is a 28 y.o. female who is  and currently at 29w1d weeks gestational age with a male fetus.  A fetal echocardiogram was performed today for pregnancy resulted from in vitro fertilization.     Fetal echocardiogram today demonstrated grossly normal fetal cardiac anatomy, qualitatively normal fetal cardiac function, normal fetal heart rhythm, and no evidence of in utero congestive heart failure or hydrops fetalis.  I reviewed the results of today's evaluation, including the findings of the fetal echocardiogram, with Ms. Vonda Francis in detail.      I discussed limitations of the technology of fetal echocardiography with Ms. Vonda Francis in detail.  I explained that fetal echocardiography cannot exclude all forms of congenital heart disease.  Fetal echocardiography may be insensitive to some defects of atrial and ventricular septation, minor valvular abnormalities, partial anomalous pulmonary venous  return, and coarctation of the aorta.  In addition, normal fetal echocardiogram does not ensure that the fetal ductus arteriosus or foramen ovale will close.      Recommendations:  No changes to prenatal care.    Further fetal cardiac imaging is not required at this time.  We would be happy to see Ms. Vonda Francis in the future if new concerns arise.    Triage code 0:        No changes to delivery planning.  Delivery per obstetrics at patient´s preferred hospital.  Standard  care per  team.        No pediatric cardiology consult needed after birth unless there are concerns/issues.    I spent greater than 60 minutes in performance of this consultation, of which greater than 50% was related to coordination of care or counseling.    This assessment and plan, in addition to the results of relevant testing were explained to Vonda. All questions were answered and understanding was demonstrated.    It was a pleasure to see Ms. Vonda Francis today.  If you have any questions or concerns regarding this evaluation, do not hesitate to contact me.      Jane Vigil MD, FACC, FAAP   of Pediatrics  Division of Pediatric Cardiology  [facility] [place] [address]  Phone: [phone]  Fax: [phone]  e-mail: [email or web address]

## 2023-12-11 NOTE — PATIENT INSTRUCTIONS
On fetal echocardiogram today the structure, size, function (squeeze), and rhythm of your fetus' heart were normal.  There are some limitations to fetal echocardiogram as described below, and because it is not a perfect test it is important to know that we cannot rule out all possible heart problems (see below).  We will communicate these results with your obstetrician.    It was a pleasure to see you today.  We do not need to see you back for routine follow-up during the remainder of your pregnancy.  However, we would be happy to see you back if new issues or concerns arise.  After birth your baby does not need to see a cardiologist unless the pediatric team has concerns.  If you have any questions or concerns regarding this evaluation, please do not hesitate to contact me.    Jane Vigil MD   of Pediatrics  Division of Pediatric Cardiology  [place] [address]  Phone: [phone]  Fax: [phone]  e-mail: [email or web address]    [email or web address]    Normal Fetal Echocardiogram    Today you had an ultrasound of your fetus' heart (fetal echocardiogram).  Based on all of the pictures taken today, the heart appears normal and is developing as expected for this point in your pregnancy.   Therefore, no further testing is recommended at this time.    Despite today´s normal findings, it is impossible to rule out all heart problems before birth.  This is partially because the fetus´ heart is so small, and our machine´s technology can only see structures down to a certain size.  It is also because blood flow in a fetus is different and more complicated than blood flow after birth.    Briefly:  ° Fetuses get oxygen from the placenta instead of their lungs.  High-oxygen (red) blood from the placenta comes back to the right side of the fetus´  heart.  ° Red blood crosses to the left side of the heart through a hole between the upper chambers of the heart, the foramen ovale.  The foramen ovale lets the red blood flow to the body.  ° Low-oxygen (blue) blood stays on the right side of the heart.  After leaving the heart, the blue blood flows through a special blood vessel known as the ductus arteriosus.  The ductus arteriosus allows the blue blood to bypass the lungs and return to the placenta to get oxygen.  ° After birth the foramen ovale and ductus arteriosus should close, but sometimes they do not.  It is impossible to predict in which children these structures will remain open.    ° Thus, on fetal echo we cannot rule out that your baby will have a patent foramen ovale (PFO) or patent ductus arteriosus (PDA) after birth.    In addition, fetal echocardiography can miss other heart defects including:  ° Small or medium-sized holes between the upper or lower heart chambers.  ° Minor abnormalities of the heart valves.  ° Narrowing of the main artery that goes to the body (coarctation of the aorta).  ° Other rare abnormalities of the veins or arteries.    If any of these defects are present, there should be findings after birth that will alert your child´s doctor that there is a problem and prompt further evaluation.  After delivery, if your pediatrician has any concerns, your child's heart can be reevaluated at that time.  Fetal single normal echo

## 2023-12-11 NOTE — PROGRESS NOTES
[facility]  [facility]  Division of Pediatric Cardiology  [facility]  [address], [place] [address]  Tel: [phone], Fax [phone]     Obstetrician: Dr. Eneida Joe     Vonda Francis was seen at the request of Dr. Eneida Joe for pregnancy resulted from in vitro fertilization.  Records were reviewed, and a summary of those records is integrated within the history of present illness.  A report with my findings is being sent via written or electronic means to the referring physician with my recommendations    Accompanied by:     History obtained from:  Self    History of Presentation   History of Present Illness:   Vonda Francis is a 28 y.o. female presenting for initial prenatal cardiology consultation and fetal echocardiogram for pregnancy resulted from in vitro fertilization.  She is  and approximately 29w1d weeks pregnant (Last Menstrual Period 2023, Estimated Date of Delivery: 24) with a male fetus.  Her obstetric history is significant for one full term vaginal delivery.  Complications of her current pregnancy include in vitro fertilization conception, class I obesity (BMI 30), history of gestational diabetes and gestational hypertension, subchorionic bleed and low lying placenta.  Ms. Vonda Francis had a normal first trimester screen.  Her level 2 obstetric ultrasound for anatomy was performed at 19 2/7  weeks gestational age and was normal.  She has  undergone cell free fetal DNA testing and it was risk reducing.  She has not had invasive genetic testing such as amniocentesis or chorionic villous sampling.  This pregnancy was not the result of in vitro fertilization.  She was not using potentially teratogenic medication at the time of conception.  There have been no other complications of this  pregnancy.  Ms. Vonda Francis plans to deliver her baby at [facility].    Ms. Vonda Francis feels well today.  She denies any shortness of breath, abdominal cramping, contractions, bleeding, or swelling of the extremities.  She notes frequent fetal movement.        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    Current Outpatient Medications   Medication Instructions    levothyroxine (SYNTHROID, LEVOXYL) 25 mcg, oral, Daily    PRENATAL 2-IRON-FOLIC ACID-OM3 ORAL oral, Daily RT      Allergies:  Penicillins    Social History:  Patient lives with  her  and children .   Occupation: Stay at home mom  Smoking: None  Alcohol: None  Drug Use: None  She wears a seatbelt while in the car. She denies any verbal, sexual or physical abuse.     Cardiac Family History (for patient and father of baby):  There is no history of congenital heart disease.  There is no history of early sudden/unexplained death including SIDS and drowning.  There is no history of cardiomyopathy of any type or heart transplant.  There is no history of arrhythmias/pacemaker/defibrillator or arrhythmia syndromes, including Long QT syndrome, Oren-Parkinson-White syndrome or Brugada syndrome.  There is no history of heart attack or stroke before the age of 55 years in a close family member.  There is no history of Marfan syndrome or aortic aneurysm.  There is no history of deafness.  There is no history of syncope/fainting.  There is no history  "of high blood pressure or high cholesterol.  There is no history of DiGeorge Syndrome (22q11).    Physical Examination     /71 (BP Location: Right arm, Patient Position: Sitting, BP Cuff Size: Adult)   Pulse 98   Ht 1.65 m (5' 4.96\")   Wt 80.7 kg (177 lb 14.6 oz)   SpO2 99%   BMI 29.64 kg/m²     General: Alert, well-appearing and in no acute distress.    Abdomen: Soft, nontender, not distended. Gravid.  Extremities: No swelling or edema.  Neurologic / Psychiatric: Grossly intact without focal deficits.  Appropriate demeanor.      Results     Fetal Echocardiogram:    I ordered and interpreted a transabdominal fetal echocardiogram.  I performed a portion of the study myself.  The complete report is available under separate cover.  The results are summarized as follows:    Image quality: Good  Cardiac situs: Cardiac mass in the left chest.  Left ventricular apex points leftward.  Segmental anatomy: Atrio-ventricular concordance.  Ventriculo-arterial concordance.  Normally-related great arteries.  Superior vena cava: Normal connection to the right atrium.  Inferior vena cava: Normal connection to the right atrium.  Pulmonary veins: At least one pulmonary vein connects normally to the left atrium.  Atrial septum: Patent foramen ovale, with flap valve bowing into the left atrium.  Tricuspid valve: Structurally normal.  No obvious stenosis or insufficiency.  Mitral valve: Structurally normal.  No obvious stenosis or insufficiency.  Right ventricle: Normal ventricular size, wall thickness, and systolic function (qualitative).  Left ventricle: Normal ventricular size, wall thickness, and systolic function (qualitative).  Interventricular septum: No obvious septal defect.  Aortic valve: Structurally normal.  No obvious stenosis or insufficiency.  Pulmonary valve: Structurally normal.  No obvious stenosis or insufficiency.  Pulmonary artery: Normal in size.  Ductal arch: Patent with right to left shunting.  Aortic " arch: Left-sided.  Patent.  Pericardial effusion: None.  Umbilical arteries: Two umbilical arteries.  Normal arterial flow pattern.  Umbilical vein: Normal venous flow pattern.  Electrophysiology: Normal fetal heart rate and rhythm.  Normal mechanical UT interval.      Assessment & Plan   Assessment:  Ms. Vonda Francis is a 28 y.o. female who is  and currently at 29w1d weeks gestational age with a male fetus.  A fetal echocardiogram was performed today for pregnancy resulted from in vitro fertilization.     Fetal echocardiogram today demonstrated grossly normal fetal cardiac anatomy, qualitatively normal fetal cardiac function, normal fetal heart rhythm, and no evidence of in utero congestive heart failure or hydrops fetalis.  I reviewed the results of today's evaluation, including the findings of the fetal echocardiogram, with Ms. Vonda Francis in detail.      I discussed limitations of the technology of fetal echocardiography with Ms. Vonda Francis in detail.  I explained that fetal echocardiography cannot exclude all forms of congenital heart disease.  Fetal echocardiography may be insensitive to some defects of atrial and ventricular septation, minor valvular abnormalities, partial anomalous pulmonary venous return, and coarctation of the aorta.  In addition, normal fetal echocardiogram does not ensure that the fetal ductus arteriosus or foramen ovale will close.      Recommendations:  No changes to prenatal care.    Further fetal cardiac imaging is not required at this time.  We would be happy to see Ms. Vonda Francis in the future if new concerns arise.    Triage code 0:        No changes to delivery planning.  Delivery per obstetrics at patient´s preferred hospital.  Standard  care per  team.        No pediatric cardiology consult needed after birth unless there are concerns/issues.    I spent greater than 60 minutes in performance of this consultation, of  which greater than 50% was related to coordination of care or counseling.    This assessment and plan, in addition to the results of relevant testing were explained to Vonda. All questions were answered and understanding was demonstrated.    It was a pleasure to see Ms. Vonda Francis today.  If you have any questions or concerns regarding this evaluation, do not hesitate to contact me.      Jane Vigil MD, FACC, FAAP   of Pediatrics  Division of Pediatric Cardiology  [place] [address]  Phone: [phone]  Fax: [phone]  e-mail: [email or web address]

## 2023-12-12 ENCOUNTER — ROUTINE PRENATAL (OUTPATIENT)
Dept: OBSTETRICS AND GYNECOLOGY | Facility: CLINIC | Age: 28
End: 2023-12-12
Payer: COMMERCIAL

## 2023-12-12 ENCOUNTER — LAB (OUTPATIENT)
Dept: LAB | Facility: LAB | Age: 28
End: 2023-12-12
Payer: COMMERCIAL

## 2023-12-12 VITALS — DIASTOLIC BLOOD PRESSURE: 80 MMHG | BODY MASS INDEX: 29.82 KG/M2 | SYSTOLIC BLOOD PRESSURE: 122 MMHG | WEIGHT: 179 LBS

## 2023-12-12 DIAGNOSIS — Z34.82 PRENATAL CARE, SUBSEQUENT PREGNANCY, SECOND TRIMESTER (HHS-HCC): ICD-10-CM

## 2023-12-12 DIAGNOSIS — O35.9XX1: ICD-10-CM

## 2023-12-12 DIAGNOSIS — Z34.82 MULTIGRAVIDA IN SECOND TRIMESTER (HHS-HCC): Primary | ICD-10-CM

## 2023-12-12 LAB
ERYTHROCYTE [DISTWIDTH] IN BLOOD BY AUTOMATED COUNT: 13.7 % (ref 11.5–14.5)
GLUCOSE 1H P 50 G GLC PO SERPL-MCNC: 124 MG/DL
HCT VFR BLD AUTO: 27.4 % (ref 36–46)
HGB BLD-MCNC: 8.1 G/DL (ref 12–16)
MCH RBC QN AUTO: 22.2 PG (ref 26–34)
MCHC RBC AUTO-ENTMCNC: 29.6 G/DL (ref 32–36)
MCV RBC AUTO: 75 FL (ref 80–100)
NRBC BLD-RTO: 0 /100 WBCS (ref 0–0)
PLATELET # BLD AUTO: 198 X10*3/UL (ref 150–450)
RBC # BLD AUTO: 3.65 X10*6/UL (ref 4–5.2)
WBC # BLD AUTO: 7 X10*3/UL (ref 4.4–11.3)

## 2023-12-12 PROCEDURE — 85027 COMPLETE CBC AUTOMATED: CPT

## 2023-12-12 PROCEDURE — 82947 ASSAY GLUCOSE BLOOD QUANT: CPT

## 2023-12-12 PROCEDURE — 82728 ASSAY OF FERRITIN: CPT

## 2023-12-12 PROCEDURE — 83550 IRON BINDING TEST: CPT

## 2023-12-12 PROCEDURE — 0501F PRENATAL FLOW SHEET: CPT | Performed by: OBSTETRICS & GYNECOLOGY

## 2023-12-12 PROCEDURE — 36415 COLL VENOUS BLD VENIPUNCTURE: CPT

## 2023-12-12 NOTE — PROGRESS NOTES
Subjective   Patient ID [id number]   Vonda Francis is a 28 y.o.  at 29w2d with a working estimated date of delivery of 2024, by Ultrasound who presents for a routine prenatal visit. She denies vaginal bleeding, leakage of fluid, decreased fetal movements, or contractions.    Her pregnancy is complicated by:  Vaginal bleeding at 22 weeks  Low lying on anatomy US  IVF conception  Hx GDM  Hx GHTN    Objective   Physical Exam:   Weight: 81.2 kg (179 lb)  Expected Total Weight Gain: 7 kg (15 lb)-11.5 kg (25 lb)   Pregravid BMI: 28.32  BP: 122/80                  Prenatal Labs  Urine Dip:  Lab Results   Component Value Date    KETONESU NEGATIVE 2023     Lab Results   Component Value Date    HGB 12.5 2023    HCT 39.5 2023    ABO CANCELED 2023    ABO O 2023    HEPBSAG NONREACTIVE 2023           Imaging: Growth US in 2 weeks      Assessment/Plan   Problem List Items Addressed This Visit             ICD-10-CM    Suspected fetal abnormality affecting management of mother, antepartum O35.9XX0     Other Visit Diagnoses         Codes    Multigravida in second trimester    -  Primary Z34.82    Patient doing well  Precautions given  RTO 2 weeks           Continue prenatal vitamin.  Labs reviewed.  GBS 36 weeks  Expected mode of delivery TBD  Follow up in 2 week for a routine prenatal visit.  Eneida Joe DO

## 2023-12-12 NOTE — PATIENT INSTRUCTIONS
You were seen in the office today for routine OB care and had normal findings on exam  Continue routine OB precautions at home  Continue taking prenatal vitamins   Avoid sick contacts and consider getting your Flu (available in office during flu season) and COVID vaccines to protect against infection in pregnancy  We recommend getting the Tdap (available in office) and RSV vaccines to pass some immunity from mother to baby and protect your baby against RSV and Whooping cough in the first few months of life. Tdap can be given between 27 and 36 weeks, and RSV vaccinations can be given between 32 and 36 weeks gestation  Make an appointment for routine care in the office in the next 2 weeks  If you are having any painful contractions, vaginal bleeding, leaking amniotic fluid, or decrease in baby's movements prior to your next visit please call the office to speak to the physician on call. This includes after hours, weekends, and holidays, when the answering service will be able to connect you with the physician on call. [phone] ([place] Office) or [phone] ([place] Office).     RSV Vaccination in Pregnancy:  ACOG recommends the Pfizer RSV vaccine if you are 32 to 36 weeks pregnant from September to January. The vaccines creates  antibodies (immune proteins) that pass from you to your fetus. This means the baby will have some antibodies to protect them from RSV for the first 6 months after birth  RSV, or respiratory syncytial virus is a virus that spreads in the fall and winter. RSV can be dangerous for babies and young children. It is the leading cause of hospitalization among infants in the [place].  There are multiple RSV vaccines approved by the U.S. Food and Drug Administration (FDA). The only RSV vaccine approved by the FDA for use in pregnancy is the one made by Pfizer. It is called Abrysvo.  You can get the Pfizer RSV vaccine at the same time as other vaccines recommended in pregnancy (COVID,  Flu, Tdap). Common side effects of the RSV vaccine include arm pain, headache, muscle pain, and nausea, similar to other vaccines side effects. Side effects are normal and not a cause for concern.   The RSV vaccine is one of two options for protecting babies during RSV season. There is also an option to give babies an injection called nirsevimab. Nirsevimab contains lab-made antibodies that protect against RSV. It is not a vaccine.   In most cases, you should choose between the RSV vaccine during pregnancy nirsevimab after birth (not both). The goal is to protect your baby from RSV, either with antibodies made during pregnancy or with antibodies given directly to your baby after birth.  Benefits to getting the RSV vaccine during pregnancy include: RSV vaccine in pregnancy gives your baby protection right after birth, decreasing the number of injections given to your baby after birth, potential difficulty getting nirsevimab in he fall/winter due to less availability during busy vaccination seasons. Benefits to nirsevimab include possible longer lasting protection.   We fully support the recommendations by ACOG regarding the RSV vaccine in pregnancy. We are unable to offer this vaccine in the office at this time. You should be able to get this vaccine through your primary care physician, pharmacies, and minute clinics. Please make sure you are receiving the Pfizer vaccine, as it is the only version to be FDA approved for use in pregnancy. Please let us know if you have had this vaccine so we can include it in your chart.

## 2023-12-13 LAB
FERRITIN SERPL-MCNC: 11 NG/ML
IRON SATN MFR SERPL: NORMAL %
IRON SERPL-MCNC: 20 UG/DL
REFLEX ADDED, ANEMIA PANEL: NORMAL
TIBC SERPL-MCNC: NORMAL UG/DL
UIBC SERPL-MCNC: >450 UG/DL

## 2023-12-26 ENCOUNTER — ANCILLARY PROCEDURE (OUTPATIENT)
Dept: RADIOLOGY | Facility: CLINIC | Age: 28
End: 2023-12-26
Payer: COMMERCIAL

## 2023-12-26 DIAGNOSIS — O09.819 PREGNANCY CONCEIVED THROUGH IN VITRO FERTILIZATION (HHS-HCC): ICD-10-CM

## 2023-12-26 DIAGNOSIS — Z34.90 PREGNANT (HHS-HCC): ICD-10-CM

## 2023-12-26 PROCEDURE — 76819 FETAL BIOPHYS PROFIL W/O NST: CPT | Performed by: OBSTETRICS & GYNECOLOGY

## 2023-12-26 PROCEDURE — 76816 OB US FOLLOW-UP PER FETUS: CPT

## 2023-12-26 PROCEDURE — 76816 OB US FOLLOW-UP PER FETUS: CPT | Performed by: OBSTETRICS & GYNECOLOGY

## 2023-12-26 PROCEDURE — 76819 FETAL BIOPHYS PROFIL W/O NST: CPT

## 2023-12-26 PROCEDURE — 76817 TRANSVAGINAL US OBSTETRIC: CPT | Performed by: OBSTETRICS & GYNECOLOGY

## 2023-12-26 PROCEDURE — 76817 TRANSVAGINAL US OBSTETRIC: CPT

## 2024-01-09 ENCOUNTER — APPOINTMENT (OUTPATIENT)
Dept: OBSTETRICS AND GYNECOLOGY | Facility: CLINIC | Age: 29
End: 2024-01-09
Payer: COMMERCIAL

## 2024-01-09 NOTE — PROGRESS NOTES
Vonda Francis is a 28 y.o.  at GA 33w2d with a working estimated date of delivery of 24 by last menstrual period presents today for her routine prenatal visit.    ***    Assessment/Plan       Scribe Attestation  By signing my name below, IShanna, Scribnikki   attest that this documentation has been prepared under the direction and in the presence of Jason Irby MD.

## 2024-01-10 ENCOUNTER — LAB (OUTPATIENT)
Dept: LAB | Facility: LAB | Age: 29
End: 2024-01-10
Payer: COMMERCIAL

## 2024-01-10 ENCOUNTER — ROUTINE PRENATAL (OUTPATIENT)
Dept: OBSTETRICS AND GYNECOLOGY | Facility: CLINIC | Age: 29
End: 2024-01-10
Payer: COMMERCIAL

## 2024-01-10 VITALS — DIASTOLIC BLOOD PRESSURE: 60 MMHG | WEIGHT: 180 LBS | SYSTOLIC BLOOD PRESSURE: 118 MMHG | BODY MASS INDEX: 29.99 KG/M2

## 2024-01-10 DIAGNOSIS — O99.340 DEPRESSION AFFECTING PREGNANCY (HHS-HCC): ICD-10-CM

## 2024-01-10 DIAGNOSIS — Z3A.33 33 WEEKS GESTATION OF PREGNANCY (HHS-HCC): Primary | ICD-10-CM

## 2024-01-10 DIAGNOSIS — O99.013 ANEMIA DURING PREGNANCY IN THIRD TRIMESTER (HHS-HCC): ICD-10-CM

## 2024-01-10 DIAGNOSIS — Z34.83 PRENATAL CARE, SUBSEQUENT PREGNANCY, THIRD TRIMESTER (HHS-HCC): ICD-10-CM

## 2024-01-10 DIAGNOSIS — F32.A DEPRESSION AFFECTING PREGNANCY (HHS-HCC): ICD-10-CM

## 2024-01-10 LAB
ERYTHROCYTE [DISTWIDTH] IN BLOOD BY AUTOMATED COUNT: 15.3 % (ref 11.5–14.5)
HCT VFR BLD AUTO: 29 % (ref 36–46)
HGB BLD-MCNC: 8.6 G/DL (ref 12–16)
HGB RETIC QN: 16 PG (ref 28–38)
IMMATURE RETIC FRACTION: 30.7 %
MCH RBC QN AUTO: 21.5 PG (ref 26–34)
MCHC RBC AUTO-ENTMCNC: 29.7 G/DL (ref 32–36)
MCV RBC AUTO: 73 FL (ref 80–100)
NRBC BLD-RTO: 0 /100 WBCS (ref 0–0)
PLATELET # BLD AUTO: 254 X10*3/UL (ref 150–450)
POC GLUCOSE, URINE: NEGATIVE MG/DL
POC PROTEIN, URINE: ABNORMAL MG/DL
RBC # BLD AUTO: 4 X10*6/UL (ref 4–5.2)
RETICS #: 0.09 X10*6/UL (ref 0.02–0.08)
RETICS/RBC NFR AUTO: 2.3 % (ref 0.5–2)
WBC # BLD AUTO: 9 X10*3/UL (ref 4.4–11.3)

## 2024-01-10 PROCEDURE — 85027 COMPLETE CBC AUTOMATED: CPT

## 2024-01-10 PROCEDURE — 0501F PRENATAL FLOW SHEET: CPT | Performed by: NURSE PRACTITIONER

## 2024-01-10 PROCEDURE — 36415 COLL VENOUS BLD VENIPUNCTURE: CPT

## 2024-01-10 PROCEDURE — 85045 AUTOMATED RETICULOCYTE COUNT: CPT

## 2024-01-10 RX ORDER — SERTRALINE HYDROCHLORIDE 25 MG/1
25 TABLET, FILM COATED ORAL DAILY
Qty: 30 TABLET | Refills: 0 | Status: SHIPPED | OUTPATIENT
Start: 2024-01-10 | End: 2024-02-15

## 2024-01-10 NOTE — PROGRESS NOTES
Vonda Francis is a 28 y.o.  who presents at 33w3d with Estimated Date of Delivery: 24 for a routine prenatal visit.   She is doing well overall. She does reports worsening symptoms of depression, lack of interest, irritability, anger outbursts. Denies suicidal or homicidal ideation. She has spoke with counselor in remote past. Never had to take medication for anxiety/depression in past. No h/o postpartum depression. Options for management reviewed, patient desires to start on lowest dose first. Rx Zoloft 25 mg sent, advised to follow up in 1 week. Also encouraged her to establish care with counselor as well.  Baby is active.  Counseled and advised flu, TDAP, RSV vaccines. Patient declines.  Labor plans discussed. Desires epidural. Planning to bottle feed. Dr. Mari is planned pediatrician.     Her pregnancy is complicated by:  Anemia: Supplementing with iron. Repeat CBC, retic count ordered today.   Low-lying placenta: Resolution on US on .   IVF conception: Plan NSTs starting at 36 weeks  H/O GDM: Passed glucose screening.   H/O GHTN: Taking aspirin prophylaxis. BP normotensive.     Objective   Fetal Heart Rate: 135  Fundal Height (cm): 34 cm  Movement: Present  BP: 118/60  Weight  Weight: 81.6 kg (180 lb)  Urine Dipstick  Urine Protein: (!) TRACE  Urine Glucose: NEGATIVE      Assessment/Plan   Diagnoses and all orders for this visit:  Prenatal care, subsequent pregnancy, third trimester  33 weeks gestation of pregnancy  -POCT UA Automated manually resulted  -Follow up routine prenatal visit in 2 weeks.  Anemia during pregnancy in third trimester  -Labs ordered:  -     CBC; Future  -     Reticulocytes; Future  Depression affecting pregnancy  -Rx sent: sertraline (Zoloft) 25 mg tablet; Take 1 tablet (25 mg) by mouth once daily.  -Precautions given.

## 2024-01-22 ENCOUNTER — ROUTINE PRENATAL (OUTPATIENT)
Dept: OBSTETRICS AND GYNECOLOGY | Facility: CLINIC | Age: 29
End: 2024-01-22
Payer: COMMERCIAL

## 2024-01-22 VITALS — BODY MASS INDEX: 30.16 KG/M2 | WEIGHT: 181 LBS | DIASTOLIC BLOOD PRESSURE: 62 MMHG | SYSTOLIC BLOOD PRESSURE: 112 MMHG

## 2024-01-22 DIAGNOSIS — R10.9 ABDOMINAL CRAMPING AFFECTING PREGNANCY (HHS-HCC): ICD-10-CM

## 2024-01-22 DIAGNOSIS — Z3A.35 35 WEEKS GESTATION OF PREGNANCY (HHS-HCC): Primary | ICD-10-CM

## 2024-01-22 DIAGNOSIS — O26.899 ABDOMINAL CRAMPING AFFECTING PREGNANCY (HHS-HCC): ICD-10-CM

## 2024-01-22 DIAGNOSIS — O09.813: ICD-10-CM

## 2024-01-22 LAB
POC GLUCOSE, URINE: NEGATIVE MG/DL
POC PROTEIN, URINE: ABNORMAL MG/DL

## 2024-01-22 PROCEDURE — 59025 FETAL NON-STRESS TEST: CPT | Performed by: OBSTETRICS & GYNECOLOGY

## 2024-01-22 PROCEDURE — 0501F PRENATAL FLOW SHEET: CPT | Performed by: OBSTETRICS & GYNECOLOGY

## 2024-01-22 NOTE — PROGRESS NOTES
Prenatal Visit    Patient presents for routine prenatal visit.   The last 3 days has noticed cramping in her lower abdomen off and on. Also a tightening and pinching in the upper abdomen. Increased pressure.  Baby is moving. No bleeding. No leaking fluid. Increased discharge though.    Objective   Physical Exam:   Weight: 82.1 kg (181 lb)  Expected Total Weight Gain: 7 kg (15 lb)-11.5 kg (25 lb)   Pregravid BMI: 28.32  BP: 112/62                NST: Category 1, reactive. No contractions.    Assessment/Plan   1. 35 weeks gestation of pregnancy  - POCT UA Automated manually resulted  2. Pregnancy resulting from in vitro fertilization, third trimester  - Fetal nonstress test  3. Abdominal cramping affecting pregnancy  - Fetal nonstress test    NST today is reassuring. No regular contractions noted. Cervix is closed. Reassured her and reviewed signs/symptoms of labor.  Precautions given. Advised her to call for any concerns.  Follow up in 1 week.  Weekly NSTs continue next visit until delivery.  GBS at next visit.

## 2024-01-22 NOTE — PROCEDURES
Vonda Francis, a  at 35w1d with an JACINTO of 2024, by Ultrasound, was seen at [facility] for a nonstress test.    Non-Stress Test   Baseline Fetal Heart Rate for Non-Stress Test: 135 BPM  Variability in Waveform for Non-Stress Test: Moderate  Accelerations in Non-Stress Test: Yes  Decelerations in Non-Stress Test: None  Contractions in Non-Stress Test: Not present  Acoustic Stimulator for Non-Stress Test: No  Interpretation of Non-Stress Test   Interpretation of Non-Stress Test: Reactive  NST for Multiple Fetuses: No

## 2024-01-29 ENCOUNTER — PROCEDURE VISIT (OUTPATIENT)
Dept: OBSTETRICS AND GYNECOLOGY | Facility: CLINIC | Age: 29
End: 2024-01-29
Payer: COMMERCIAL

## 2024-01-29 ENCOUNTER — APPOINTMENT (OUTPATIENT)
Dept: OBSTETRICS AND GYNECOLOGY | Facility: CLINIC | Age: 29
End: 2024-01-29
Payer: COMMERCIAL

## 2024-01-29 VITALS — WEIGHT: 183 LBS | DIASTOLIC BLOOD PRESSURE: 66 MMHG | SYSTOLIC BLOOD PRESSURE: 112 MMHG | BODY MASS INDEX: 30.49 KG/M2

## 2024-01-29 DIAGNOSIS — O09.813: ICD-10-CM

## 2024-01-29 DIAGNOSIS — Z78.9 CONCEIVED BY IN VITRO FERTILIZATION: ICD-10-CM

## 2024-01-29 DIAGNOSIS — Z3A.36 36 WEEKS GESTATION OF PREGNANCY (HHS-HCC): Primary | ICD-10-CM

## 2024-01-29 PROCEDURE — 87081 CULTURE SCREEN ONLY: CPT

## 2024-01-29 PROCEDURE — 0501F PRENATAL FLOW SHEET: CPT | Performed by: OBSTETRICS & GYNECOLOGY

## 2024-01-29 NOTE — PROGRESS NOTES
Vonda Francis is a 28 y.o.  at [id number] for PAYTON and NST  +FM, no LOF, VB or ctx.   No acute concerns.  Uncomfortable when lying flat: GERD and difficulty breathing due to weight of baby. Resolves with position changes.   Patient had pregnancy complicated by IVF conception, low lying placenta, h/o GHTN and h/o GDM, and subchorionic hematoma.  -subchorionic hematoma not reported on f/up ultrasound. Normal interval growth.   -Low lying placenta resolved on 2023.  -Hx of GDM. 1 hr GCT negative this pregnancy    Assessment/Plan  NST reactive. Weekly NST in the setting of IVF  Collected GBS today  Growth ultrasound today   Labor consent signed. Labor precautions reviewed.  H/o GHTN- BP wnl. On ASA ppx this pregnancy  S/p Tdap and RSV. Flu and COVID vaccines were declined  Discussed 39 week delivery in the setting of IVF. Pt agreeable. IOL scheduled for 2024 7:30 am.    Scribe Attestation  By signing my name below, IShanna Scribe   attest that this documentation has been prepared under the direction and in the presence of Jason Irby MD.    Scribe Attestation  By signing my name below, Carrol CARRINGTON Scribe attest that this documentation has been prepared under the direction and in the presence of Jason Irby MD.

## 2024-02-01 LAB — GP B STREP GENITAL QL CULT: NORMAL

## 2024-02-06 ENCOUNTER — PROCEDURE VISIT (OUTPATIENT)
Dept: OBSTETRICS AND GYNECOLOGY | Facility: CLINIC | Age: 29
End: 2024-02-06
Payer: COMMERCIAL

## 2024-02-06 VITALS — BODY MASS INDEX: 30.99 KG/M2 | WEIGHT: 186 LBS | SYSTOLIC BLOOD PRESSURE: 104 MMHG | DIASTOLIC BLOOD PRESSURE: 60 MMHG

## 2024-02-06 DIAGNOSIS — Z3A.37 37 WEEKS GESTATION OF PREGNANCY (HHS-HCC): ICD-10-CM

## 2024-02-06 DIAGNOSIS — Z34.83 PRENATAL CARE, SUBSEQUENT PREGNANCY, THIRD TRIMESTER (HHS-HCC): Primary | ICD-10-CM

## 2024-02-06 DIAGNOSIS — Z78.9 CONCEIVED BY IN VITRO FERTILIZATION: ICD-10-CM

## 2024-02-06 LAB
POC APPEARANCE, URINE: CLEAR
POC BILIRUBIN, URINE: NEGATIVE
POC BLOOD, URINE: NEGATIVE
POC COLOR, URINE: YELLOW
POC GLUCOSE, URINE: NEGATIVE MG/DL
POC KETONES, URINE: NEGATIVE MG/DL
POC LEUKOCYTES, URINE: NEGATIVE
POC NITRITE,URINE: NEGATIVE
POC PH, URINE: 8 PH
POC PROTEIN, URINE: NEGATIVE MG/DL
POC SPECIFIC GRAVITY, URINE: 1.01
POC UROBILINOGEN, URINE: 0.2 EU/DL

## 2024-02-06 PROCEDURE — 0501F PRENATAL FLOW SHEET: CPT | Performed by: NURSE PRACTITIONER

## 2024-02-06 NOTE — PROCEDURES
Famalba EMETERIO Choudharys, a  at 37w2d with an JACINTO of 2024, by Ultrasound, was seen at [facility] for a nonstress test.    Non-Stress Test   Baseline Fetal Heart Rate for Non-Stress Test: 135 BPM  Variability in Waveform for Non-Stress Test: Moderate  Accelerations in Non-Stress Test: Yes, greater than/equal to 15 bpm, lasting at least 15 seconds  Decelerations in Non-Stress Test: None  Contractions in Non-Stress Test: Not present  Acoustic Stimulator for Non-Stress Test: No  Interpretation of Non-Stress Test   Interpretation of Non-Stress Test: Reactive  Comments on Non-Stress Test: Category 1

## 2024-02-06 NOTE — PROGRESS NOTES
Vonda Francis is a 28 y.o.  who presents at 37w2d with Estimated Date of Delivery: 24 for a routine prenatal visit.   She is feeling well. Baby is active.  Reports heartburn and reflux. Taking Tums as needed. Discussed switching to Pepcid.  Discussed GBS negative results.  Patient planning to bottle feed. Dr. Valentin is planned pediatrician.  IOL is scheduled for .     Her pregnancy is complicated by:  IVF conception  H/O GDM: Passed glucose screening  H/O GHTN: BP is normotensive  Depression: Managed on sertraline     Objective   Fetal Heart Rate: 135  Movement: Present  BP: 104/60  Weight  Weight: 84.4 kg (186 lb)  Urine Dipstick  Urine Protein: NEGATIVE  Urine Leukocytes: NEGATIVE  Urine Ketone: NEGATIVE  Urine Glucose: NEGATIVE  Cervical Exam  Dilation: Closed  Effacement (%): 50  Fetal Station: -3  Presentation: Vertex     NST reactive, category 1     Assessment/Plan   Diagnoses and all orders for this visit:  Prenatal care, subsequent pregnancy, third trimester  37 weeks gestation of pregnancy  -POCT UA (nonautomated) manually resulted  Conceived by in vitro fertilization  NST reactive, category 1  Weekly NSTs  Labor precautions given  Follow up routine prenatal visit in 1 week or sooner if needed

## 2024-02-12 ENCOUNTER — PROCEDURE VISIT (OUTPATIENT)
Dept: OBSTETRICS AND GYNECOLOGY | Facility: CLINIC | Age: 29
End: 2024-02-12
Payer: COMMERCIAL

## 2024-02-12 VITALS — WEIGHT: 188 LBS | BODY MASS INDEX: 31.32 KG/M2 | DIASTOLIC BLOOD PRESSURE: 80 MMHG | SYSTOLIC BLOOD PRESSURE: 122 MMHG

## 2024-02-12 DIAGNOSIS — O09.813: Primary | ICD-10-CM

## 2024-02-12 DIAGNOSIS — Z3A.38 38 WEEKS GESTATION OF PREGNANCY (HHS-HCC): ICD-10-CM

## 2024-02-12 LAB
POC GLUCOSE, URINE: NEGATIVE MG/DL
POC PROTEIN, URINE: ABNORMAL MG/DL

## 2024-02-12 PROCEDURE — 59025 FETAL NON-STRESS TEST: CPT | Performed by: OBSTETRICS & GYNECOLOGY

## 2024-02-12 PROCEDURE — 0501F PRENATAL FLOW SHEET: CPT | Performed by: OBSTETRICS & GYNECOLOGY

## 2024-02-12 NOTE — PROGRESS NOTES
Subjective   Vonda Francis is a 28 y.o.  at 38w1d, presents for a routine prenatal visit.   Feels well. Reports good fetal movement.   Has induction scheduled next Monday.  Declines cervical exam today.    Objective     /80   Wt 85.3 kg (188 lb)   BMI 31.32 kg/m²   NST reactive, no contractions.  Last growth ultrasound 2023 31 wks EFW 80%ile at that time. Pt did not have a 36 wk growth ultrasound but nl interval growth on 31 wk ultrasound, resolved low lying placenta.    Plan  38 1/7 wks  Reactive NST  Induction of labor changed to Sun pm into Monday.     Yesica Anne MD

## 2024-02-15 ENCOUNTER — ANESTHESIA (OUTPATIENT)
Dept: OBSTETRICS AND GYNECOLOGY | Facility: HOSPITAL | Age: 29
End: 2024-02-15
Payer: COMMERCIAL

## 2024-02-15 ENCOUNTER — ROUTINE PRENATAL (OUTPATIENT)
Dept: OBSTETRICS AND GYNECOLOGY | Facility: CLINIC | Age: 29
End: 2024-02-15
Payer: COMMERCIAL

## 2024-02-15 ENCOUNTER — HOSPITAL ENCOUNTER (INPATIENT)
Facility: HOSPITAL | Age: 29
LOS: 1 days | Discharge: HOME | End: 2024-02-16
Attending: OBSTETRICS & GYNECOLOGY | Admitting: OBSTETRICS & GYNECOLOGY
Payer: COMMERCIAL

## 2024-02-15 ENCOUNTER — ANESTHESIA EVENT (OUTPATIENT)
Dept: OBSTETRICS AND GYNECOLOGY | Facility: HOSPITAL | Age: 29
End: 2024-02-15
Payer: COMMERCIAL

## 2024-02-15 VITALS — WEIGHT: 188 LBS | BODY MASS INDEX: 31.32 KG/M2 | SYSTOLIC BLOOD PRESSURE: 122 MMHG | DIASTOLIC BLOOD PRESSURE: 78 MMHG

## 2024-02-15 DIAGNOSIS — Z3A.38 38 WEEKS GESTATION OF PREGNANCY (HHS-HCC): ICD-10-CM

## 2024-02-15 PROBLEM — Z3A.36 36 WEEKS GESTATION OF PREGNANCY (HHS-HCC): Status: RESOLVED | Noted: 2024-01-29 | Resolved: 2024-02-15

## 2024-02-15 PROBLEM — O13.9 GESTATIONAL HYPERTENSION (HHS-HCC): Status: ACTIVE | Noted: 2024-02-15

## 2024-02-15 PROBLEM — O13.9 GESTATIONAL HYPERTENSION (HHS-HCC): Status: RESOLVED | Noted: 2024-02-15 | Resolved: 2024-02-15

## 2024-02-15 PROBLEM — E03.9 HYPOTHYROIDISM: Status: ACTIVE | Noted: 2024-02-15

## 2024-02-15 PROBLEM — Z34.90 TERM PREGNANCY (HHS-HCC): Status: ACTIVE | Noted: 2024-02-15

## 2024-02-15 PROBLEM — D50.9 IRON DEFICIENCY ANEMIA: Status: ACTIVE | Noted: 2024-02-15

## 2024-02-15 PROBLEM — O24.410: Status: RESOLVED | Noted: 2023-10-13 | Resolved: 2024-02-15

## 2024-02-15 LAB
ABO GROUP (TYPE) IN BLOOD: NORMAL
ANTIBODY SCREEN: NORMAL
ERYTHROCYTE [DISTWIDTH] IN BLOOD BY AUTOMATED COUNT: 17.2 % (ref 11.5–14.5)
HCT VFR BLD AUTO: 27.1 % (ref 36–46)
HGB BLD-MCNC: 7.4 G/DL (ref 12–16)
MCH RBC QN AUTO: 18.5 PG (ref 26–34)
MCHC RBC AUTO-ENTMCNC: 27.3 G/DL (ref 32–36)
MCV RBC AUTO: 68 FL (ref 80–100)
NRBC BLD-RTO: 0.2 /100 WBCS (ref 0–0)
PLATELET # BLD AUTO: 180 X10*3/UL (ref 150–450)
POC GLUCOSE, URINE: NEGATIVE MG/DL
POC PROTEIN, URINE: NEGATIVE MG/DL
RBC # BLD AUTO: 3.99 X10*6/UL (ref 4–5.2)
RH FACTOR (ANTIGEN D): NORMAL
WBC # BLD AUTO: 10.3 X10*3/UL (ref 4.4–11.3)

## 2024-02-15 PROCEDURE — 7100000016 HC LABOR RECOVERY PER HOUR

## 2024-02-15 PROCEDURE — 2500000004 HC RX 250 GENERAL PHARMACY W/ HCPCS (ALT 636 FOR OP/ED): Performed by: NURSE ANESTHETIST, CERTIFIED REGISTERED

## 2024-02-15 PROCEDURE — 2500000001 HC RX 250 WO HCPCS SELF ADMINISTERED DRUGS (ALT 637 FOR MEDICARE OP)

## 2024-02-15 PROCEDURE — 01967 NEURAXL LBR ANES VAG DLVR: CPT | Performed by: NURSE ANESTHETIST, CERTIFIED REGISTERED

## 2024-02-15 PROCEDURE — 2500000004 HC RX 250 GENERAL PHARMACY W/ HCPCS (ALT 636 FOR OP/ED): Performed by: OBSTETRICS & GYNECOLOGY

## 2024-02-15 PROCEDURE — 1120000001 HC OB PRIVATE ROOM DAILY

## 2024-02-15 PROCEDURE — 2500000005 HC RX 250 GENERAL PHARMACY W/O HCPCS: Performed by: NURSE ANESTHETIST, CERTIFIED REGISTERED

## 2024-02-15 PROCEDURE — 0501F PRENATAL FLOW SHEET: CPT | Performed by: OBSTETRICS & GYNECOLOGY

## 2024-02-15 PROCEDURE — 36430 TRANSFUSION BLD/BLD COMPNT: CPT

## 2024-02-15 PROCEDURE — 59050 FETAL MONITOR W/REPORT: CPT

## 2024-02-15 PROCEDURE — 36415 COLL VENOUS BLD VENIPUNCTURE: CPT | Performed by: OBSTETRICS & GYNECOLOGY

## 2024-02-15 PROCEDURE — 86780 TREPONEMA PALLIDUM: CPT | Mod: GEALAB | Performed by: OBSTETRICS & GYNECOLOGY

## 2024-02-15 PROCEDURE — 0KQM0ZZ REPAIR PERINEUM MUSCLE, OPEN APPROACH: ICD-10-PCS | Performed by: OBSTETRICS & GYNECOLOGY

## 2024-02-15 PROCEDURE — 86920 COMPATIBILITY TEST SPIN: CPT

## 2024-02-15 PROCEDURE — 2500000001 HC RX 250 WO HCPCS SELF ADMINISTERED DRUGS (ALT 637 FOR MEDICARE OP): Performed by: OBSTETRICS & GYNECOLOGY

## 2024-02-15 PROCEDURE — 2500000004 HC RX 250 GENERAL PHARMACY W/ HCPCS (ALT 636 FOR OP/ED)

## 2024-02-15 PROCEDURE — 51701 INSERT BLADDER CATHETER: CPT

## 2024-02-15 PROCEDURE — 59400 OBSTETRICAL CARE: CPT | Performed by: OBSTETRICS & GYNECOLOGY

## 2024-02-15 PROCEDURE — 86901 BLOOD TYPING SEROLOGIC RH(D): CPT | Performed by: OBSTETRICS & GYNECOLOGY

## 2024-02-15 PROCEDURE — 85027 COMPLETE CBC AUTOMATED: CPT | Performed by: OBSTETRICS & GYNECOLOGY

## 2024-02-15 PROCEDURE — 7210000002 HC LABOR PER HOUR

## 2024-02-15 PROCEDURE — 59409 OBSTETRICAL CARE: CPT | Performed by: OBSTETRICS & GYNECOLOGY

## 2024-02-15 PROCEDURE — P9016 RBC LEUKOCYTES REDUCED: HCPCS

## 2024-02-15 RX ORDER — ONDANSETRON HYDROCHLORIDE 2 MG/ML
4 INJECTION, SOLUTION INTRAVENOUS EVERY 6 HOURS PRN
Status: DISCONTINUED | OUTPATIENT
Start: 2024-02-15 | End: 2024-02-15

## 2024-02-15 RX ORDER — IBUPROFEN 600 MG/1
600 TABLET ORAL EVERY 6 HOURS
Status: DISCONTINUED | OUTPATIENT
Start: 2024-02-15 | End: 2024-02-16 | Stop reason: HOSPADM

## 2024-02-15 RX ORDER — TRANEXAMIC ACID 100 MG/ML
1000 INJECTION, SOLUTION INTRAVENOUS ONCE AS NEEDED
Status: DISCONTINUED | OUTPATIENT
Start: 2024-02-15 | End: 2024-02-15

## 2024-02-15 RX ORDER — OXYTOCIN 10 [USP'U]/ML
10 INJECTION, SOLUTION INTRAMUSCULAR; INTRAVENOUS ONCE AS NEEDED
Status: DISCONTINUED | OUTPATIENT
Start: 2024-02-15 | End: 2024-02-15

## 2024-02-15 RX ORDER — ENOXAPARIN SODIUM 100 MG/ML
40 INJECTION SUBCUTANEOUS EVERY 24 HOURS
Status: DISCONTINUED | OUTPATIENT
Start: 2024-02-16 | End: 2024-02-16 | Stop reason: HOSPADM

## 2024-02-15 RX ORDER — OXYTOCIN/0.9 % SODIUM CHLORIDE 30/500 ML
60 PLASTIC BAG, INJECTION (ML) INTRAVENOUS ONCE AS NEEDED
Status: DISCONTINUED | OUTPATIENT
Start: 2024-02-15 | End: 2024-02-15

## 2024-02-15 RX ORDER — OXYTOCIN/0.9 % SODIUM CHLORIDE 30/500 ML
60 PLASTIC BAG, INJECTION (ML) INTRAVENOUS ONCE AS NEEDED
Status: DISCONTINUED | OUTPATIENT
Start: 2024-02-15 | End: 2024-02-16 | Stop reason: HOSPADM

## 2024-02-15 RX ORDER — ACETAMINOPHEN 160 MG/5ML
650 LIQUID ORAL EVERY 4 HOURS PRN
COMMUNITY

## 2024-02-15 RX ORDER — CARBOPROST TROMETHAMINE 250 UG/ML
250 INJECTION, SOLUTION INTRAMUSCULAR ONCE AS NEEDED
Status: DISCONTINUED | OUTPATIENT
Start: 2024-02-15 | End: 2024-02-15

## 2024-02-15 RX ORDER — LIDOCAINE HYDROCHLORIDE 10 MG/ML
30 INJECTION INFILTRATION; PERINEURAL ONCE AS NEEDED
Status: DISCONTINUED | OUTPATIENT
Start: 2024-02-15 | End: 2024-02-15

## 2024-02-15 RX ORDER — LOPERAMIDE HYDROCHLORIDE 2 MG/1
4 CAPSULE ORAL EVERY 2 HOUR PRN
Status: DISCONTINUED | OUTPATIENT
Start: 2024-02-15 | End: 2024-02-16 | Stop reason: HOSPADM

## 2024-02-15 RX ORDER — ONDANSETRON HYDROCHLORIDE 2 MG/ML
4 INJECTION, SOLUTION INTRAVENOUS EVERY 6 HOURS PRN
Status: DISCONTINUED | OUTPATIENT
Start: 2024-02-15 | End: 2024-02-16 | Stop reason: HOSPADM

## 2024-02-15 RX ORDER — FENTANYL/ROPIVACAINE/NS/PF 2MCG/ML-.2
0-25 PLASTIC BAG, INJECTION (ML) INJECTION CONTINUOUS
Status: DISCONTINUED | OUTPATIENT
Start: 2024-02-15 | End: 2024-02-15

## 2024-02-15 RX ORDER — CALCIUM CARBONATE 200(500)MG
500 TABLET,CHEWABLE ORAL DAILY
Status: DISCONTINUED | OUTPATIENT
Start: 2024-02-15 | End: 2024-02-15

## 2024-02-15 RX ORDER — LIDOCAINE 560 MG/1
1 PATCH PERCUTANEOUS; TOPICAL; TRANSDERMAL
Status: DISCONTINUED | OUTPATIENT
Start: 2024-02-15 | End: 2024-02-16 | Stop reason: HOSPADM

## 2024-02-15 RX ORDER — DIPHENHYDRAMINE HYDROCHLORIDE 50 MG/ML
25 INJECTION INTRAMUSCULAR; INTRAVENOUS EVERY 6 HOURS PRN
Status: DISCONTINUED | OUTPATIENT
Start: 2024-02-15 | End: 2024-02-16 | Stop reason: HOSPADM

## 2024-02-15 RX ORDER — LABETALOL HYDROCHLORIDE 5 MG/ML
20 INJECTION, SOLUTION INTRAVENOUS ONCE AS NEEDED
Status: DISCONTINUED | OUTPATIENT
Start: 2024-02-15 | End: 2024-02-16 | Stop reason: HOSPADM

## 2024-02-15 RX ORDER — CALCIUM CARBONATE 200(500)MG
TABLET,CHEWABLE ORAL
Status: COMPLETED
Start: 2024-02-15 | End: 2024-02-15

## 2024-02-15 RX ORDER — ADHESIVE BANDAGE
10 BANDAGE TOPICAL
Status: DISCONTINUED | OUTPATIENT
Start: 2024-02-15 | End: 2024-02-16 | Stop reason: HOSPADM

## 2024-02-15 RX ORDER — NIFEDIPINE 10 MG/1
10 CAPSULE ORAL ONCE AS NEEDED
Status: DISCONTINUED | OUTPATIENT
Start: 2024-02-15 | End: 2024-02-15

## 2024-02-15 RX ORDER — TERBUTALINE SULFATE 1 MG/ML
0.25 INJECTION SUBCUTANEOUS ONCE AS NEEDED
Status: DISCONTINUED | OUTPATIENT
Start: 2024-02-15 | End: 2024-02-15

## 2024-02-15 RX ORDER — CARBOPROST TROMETHAMINE 250 UG/ML
250 INJECTION, SOLUTION INTRAMUSCULAR ONCE AS NEEDED
Status: DISCONTINUED | OUTPATIENT
Start: 2024-02-15 | End: 2024-02-16 | Stop reason: HOSPADM

## 2024-02-15 RX ORDER — FAMOTIDINE 10 MG/ML
INJECTION INTRAVENOUS
Status: COMPLETED
Start: 2024-02-15 | End: 2024-02-15

## 2024-02-15 RX ORDER — FENTANYL/BUPIVACAINE/NS/PF 2MCG/ML-.1
PLASTIC BAG, INJECTION (ML) INJECTION AS NEEDED
Status: DISCONTINUED | OUTPATIENT
Start: 2024-02-15 | End: 2024-02-15

## 2024-02-15 RX ORDER — ACETAMINOPHEN 325 MG/1
975 TABLET ORAL EVERY 6 HOURS
Status: DISCONTINUED | OUTPATIENT
Start: 2024-02-15 | End: 2024-02-16 | Stop reason: HOSPADM

## 2024-02-15 RX ORDER — SODIUM CHLORIDE, SODIUM LACTATE, POTASSIUM CHLORIDE, CALCIUM CHLORIDE 600; 310; 30; 20 MG/100ML; MG/100ML; MG/100ML; MG/100ML
125 INJECTION, SOLUTION INTRAVENOUS CONTINUOUS
Status: DISCONTINUED | OUTPATIENT
Start: 2024-02-15 | End: 2024-02-15

## 2024-02-15 RX ORDER — LEVOTHYROXINE SODIUM 25 UG/1
25 TABLET ORAL DAILY
Status: DISCONTINUED | OUTPATIENT
Start: 2024-02-15 | End: 2024-02-15

## 2024-02-15 RX ORDER — MISOPROSTOL 200 UG/1
800 TABLET ORAL ONCE AS NEEDED
Status: DISCONTINUED | OUTPATIENT
Start: 2024-02-15 | End: 2024-02-16 | Stop reason: HOSPADM

## 2024-02-15 RX ORDER — SIMETHICONE 80 MG
80 TABLET,CHEWABLE ORAL 4 TIMES DAILY PRN
Status: DISCONTINUED | OUTPATIENT
Start: 2024-02-15 | End: 2024-02-16 | Stop reason: HOSPADM

## 2024-02-15 RX ORDER — TRANEXAMIC ACID 100 MG/ML
1000 INJECTION, SOLUTION INTRAVENOUS ONCE AS NEEDED
Status: DISCONTINUED | OUTPATIENT
Start: 2024-02-15 | End: 2024-02-16 | Stop reason: HOSPADM

## 2024-02-15 RX ORDER — METHYLERGONOVINE MALEATE 0.2 MG/ML
0.2 INJECTION INTRAVENOUS ONCE AS NEEDED
Status: DISCONTINUED | OUTPATIENT
Start: 2024-02-15 | End: 2024-02-15

## 2024-02-15 RX ORDER — LIDOCAINE HCL/EPINEPHRINE/PF 2%-1:200K
VIAL (ML) INJECTION AS NEEDED
Status: DISCONTINUED | OUTPATIENT
Start: 2024-02-15 | End: 2024-02-15

## 2024-02-15 RX ORDER — POLYETHYLENE GLYCOL 3350 17 G/17G
17 POWDER, FOR SOLUTION ORAL 2 TIMES DAILY PRN
Status: DISCONTINUED | OUTPATIENT
Start: 2024-02-15 | End: 2024-02-16 | Stop reason: HOSPADM

## 2024-02-15 RX ORDER — ONDANSETRON 4 MG/1
4 TABLET, FILM COATED ORAL EVERY 6 HOURS PRN
Status: DISCONTINUED | OUTPATIENT
Start: 2024-02-15 | End: 2024-02-15

## 2024-02-15 RX ORDER — HYDRALAZINE HYDROCHLORIDE 20 MG/ML
5 INJECTION INTRAMUSCULAR; INTRAVENOUS ONCE AS NEEDED
Status: DISCONTINUED | OUTPATIENT
Start: 2024-02-15 | End: 2024-02-15

## 2024-02-15 RX ORDER — BUPIVACAINE HYDROCHLORIDE 2.5 MG/ML
INJECTION, SOLUTION EPIDURAL; INFILTRATION; INTRACAUDAL AS NEEDED
Status: DISCONTINUED | OUTPATIENT
Start: 2024-02-15 | End: 2024-02-15

## 2024-02-15 RX ORDER — SERTRALINE HYDROCHLORIDE 50 MG/1
25 TABLET, FILM COATED ORAL DAILY
Status: DISCONTINUED | OUTPATIENT
Start: 2024-02-15 | End: 2024-02-15

## 2024-02-15 RX ORDER — OXYTOCIN 10 [USP'U]/ML
10 INJECTION, SOLUTION INTRAMUSCULAR; INTRAVENOUS ONCE AS NEEDED
Status: DISCONTINUED | OUTPATIENT
Start: 2024-02-15 | End: 2024-02-16 | Stop reason: HOSPADM

## 2024-02-15 RX ORDER — HYDRALAZINE HYDROCHLORIDE 20 MG/ML
5 INJECTION INTRAMUSCULAR; INTRAVENOUS ONCE AS NEEDED
Status: DISCONTINUED | OUTPATIENT
Start: 2024-02-15 | End: 2024-02-16 | Stop reason: HOSPADM

## 2024-02-15 RX ORDER — METHYLERGONOVINE MALEATE 0.2 MG/ML
0.2 INJECTION INTRAVENOUS ONCE AS NEEDED
Status: DISCONTINUED | OUTPATIENT
Start: 2024-02-15 | End: 2024-02-16 | Stop reason: HOSPADM

## 2024-02-15 RX ORDER — LABETALOL HYDROCHLORIDE 5 MG/ML
20 INJECTION, SOLUTION INTRAVENOUS ONCE AS NEEDED
Status: DISCONTINUED | OUTPATIENT
Start: 2024-02-15 | End: 2024-02-15

## 2024-02-15 RX ORDER — BISACODYL 10 MG/1
10 SUPPOSITORY RECTAL DAILY PRN
Status: DISCONTINUED | OUTPATIENT
Start: 2024-02-15 | End: 2024-02-16 | Stop reason: HOSPADM

## 2024-02-15 RX ORDER — FAMOTIDINE 10 MG/ML
20 INJECTION INTRAVENOUS ONCE
Status: COMPLETED | OUTPATIENT
Start: 2024-02-15 | End: 2024-02-15

## 2024-02-15 RX ORDER — DIPHENHYDRAMINE HCL 25 MG
25 CAPSULE ORAL EVERY 6 HOURS PRN
Status: DISCONTINUED | OUTPATIENT
Start: 2024-02-15 | End: 2024-02-16 | Stop reason: HOSPADM

## 2024-02-15 RX ORDER — ONDANSETRON 4 MG/1
4 TABLET, FILM COATED ORAL EVERY 6 HOURS PRN
Status: DISCONTINUED | OUTPATIENT
Start: 2024-02-15 | End: 2024-02-16 | Stop reason: HOSPADM

## 2024-02-15 RX ORDER — NIFEDIPINE 10 MG/1
10 CAPSULE ORAL ONCE AS NEEDED
Status: DISCONTINUED | OUTPATIENT
Start: 2024-02-15 | End: 2024-02-16 | Stop reason: HOSPADM

## 2024-02-15 RX ORDER — OXYTOCIN/0.9 % SODIUM CHLORIDE 30/500 ML
2-30 PLASTIC BAG, INJECTION (ML) INTRAVENOUS CONTINUOUS
Status: DISCONTINUED | OUTPATIENT
Start: 2024-02-15 | End: 2024-02-15

## 2024-02-15 RX ORDER — METOCLOPRAMIDE HYDROCHLORIDE 5 MG/ML
10 INJECTION INTRAMUSCULAR; INTRAVENOUS EVERY 6 HOURS PRN
Status: DISCONTINUED | OUTPATIENT
Start: 2024-02-15 | End: 2024-02-15

## 2024-02-15 RX ORDER — MISOPROSTOL 200 UG/1
800 TABLET ORAL ONCE AS NEEDED
Status: DISCONTINUED | OUTPATIENT
Start: 2024-02-15 | End: 2024-02-15

## 2024-02-15 RX ORDER — LOPERAMIDE HYDROCHLORIDE 2 MG/1
4 CAPSULE ORAL EVERY 2 HOUR PRN
Status: DISCONTINUED | OUTPATIENT
Start: 2024-02-15 | End: 2024-02-15

## 2024-02-15 RX ORDER — METOCLOPRAMIDE 10 MG/1
10 TABLET ORAL EVERY 6 HOURS PRN
Status: DISCONTINUED | OUTPATIENT
Start: 2024-02-15 | End: 2024-02-15

## 2024-02-15 RX ADMIN — LEVOTHYROXINE SODIUM 25 MCG: 25 TABLET ORAL at 12:42

## 2024-02-15 RX ADMIN — FAMOTIDINE 20 MG: 10 INJECTION INTRAVENOUS at 14:57

## 2024-02-15 RX ADMIN — SODIUM CHLORIDE, POTASSIUM CHLORIDE, SODIUM LACTATE AND CALCIUM CHLORIDE 500 ML: 600; 310; 30; 20 INJECTION, SOLUTION INTRAVENOUS at 14:58

## 2024-02-15 RX ADMIN — CALCIUM CARBONATE (ANTACID) CHEW TAB 500 MG 500 MG: 500 CHEW TAB at 16:00

## 2024-02-15 RX ADMIN — SODIUM CHLORIDE, POTASSIUM CHLORIDE, SODIUM LACTATE AND CALCIUM CHLORIDE 125 ML/HR: 600; 310; 30; 20 INJECTION, SOLUTION INTRAVENOUS at 15:46

## 2024-02-15 RX ADMIN — Medication 5 ML: at 15:25

## 2024-02-15 RX ADMIN — Medication 500 MG: at 16:00

## 2024-02-15 RX ADMIN — Medication 12 ML/HR: at 15:30

## 2024-02-15 RX ADMIN — SODIUM CHLORIDE, POTASSIUM CHLORIDE, SODIUM LACTATE AND CALCIUM CHLORIDE 125 ML/HR: 600; 310; 30; 20 INJECTION, SOLUTION INTRAVENOUS at 12:43

## 2024-02-15 RX ADMIN — Medication 5 ML: at 15:20

## 2024-02-15 RX ADMIN — BUPIVACAINE HYDROCHLORIDE 6 ML: 2.5 INJECTION, SOLUTION EPIDURAL; INFILTRATION; INTRACAUDAL; PERINEURAL at 17:02

## 2024-02-15 RX ADMIN — FAMOTIDINE 20 MG: 10 INJECTION, SOLUTION INTRAVENOUS at 14:57

## 2024-02-15 RX ADMIN — LIDOCAINE HYDROCHLORIDE AND EPINEPHRINE 3 ML: 20; 5 INJECTION, SOLUTION EPIDURAL; INFILTRATION; INTRACAUDAL; PERINEURAL at 17:48

## 2024-02-15 RX ADMIN — Medication 2 MILLI-UNITS/MIN: at 14:11

## 2024-02-15 RX ADMIN — BUPIVACAINE HYDROCHLORIDE 6 ML: 2.5 INJECTION, SOLUTION EPIDURAL; INFILTRATION; INTRACAUDAL; PERINEURAL at 17:50

## 2024-02-15 SDOH — HEALTH STABILITY: MENTAL HEALTH: WISH TO BE DEAD (PAST 1 MONTH): NO

## 2024-02-15 SDOH — SOCIAL STABILITY: SOCIAL INSECURITY: VERBAL ABUSE: DENIES

## 2024-02-15 SDOH — SOCIAL STABILITY: SOCIAL INSECURITY: HAVE YOU HAD THOUGHTS OF HARMING ANYONE ELSE?: NO

## 2024-02-15 SDOH — HEALTH STABILITY: MENTAL HEALTH: NON-SPECIFIC ACTIVE SUICIDAL THOUGHTS (PAST 1 MONTH): NO

## 2024-02-15 SDOH — HEALTH STABILITY: MENTAL HEALTH: WERE YOU ABLE TO COMPLETE ALL THE BEHAVIORAL HEALTH SCREENINGS?: YES

## 2024-02-15 SDOH — ECONOMIC STABILITY: HOUSING INSECURITY: DO YOU FEEL UNSAFE GOING BACK TO THE PLACE WHERE YOU ARE LIVING?: NO

## 2024-02-15 SDOH — SOCIAL STABILITY: SOCIAL INSECURITY: ABUSE SCREEN: ADULT

## 2024-02-15 SDOH — SOCIAL STABILITY: SOCIAL INSECURITY: HAS ANYONE EVER THREATENED TO HURT YOUR FAMILY OR YOUR PETS?: NO

## 2024-02-15 SDOH — HEALTH STABILITY: MENTAL HEALTH: SUICIDAL BEHAVIOR (LIFETIME): NO

## 2024-02-15 SDOH — SOCIAL STABILITY: SOCIAL INSECURITY: DOES ANYONE TRY TO KEEP YOU FROM HAVING/CONTACTING OTHER FRIENDS OR DOING THINGS OUTSIDE YOUR HOME?: NO

## 2024-02-15 SDOH — SOCIAL STABILITY: SOCIAL INSECURITY: ARE YOU OR HAVE YOU BEEN THREATENED OR ABUSED PHYSICALLY, EMOTIONALLY, OR SEXUALLY BY ANYONE?: NO

## 2024-02-15 SDOH — SOCIAL STABILITY: SOCIAL INSECURITY: PHYSICAL ABUSE: DENIES

## 2024-02-15 SDOH — SOCIAL STABILITY: SOCIAL INSECURITY: ARE THERE ANY APPARENT SIGNS OF INJURIES/BEHAVIORS THAT COULD BE RELATED TO ABUSE/NEGLECT?: NO

## 2024-02-15 SDOH — HEALTH STABILITY: MENTAL HEALTH: CURRENT SMOKER: 0

## 2024-02-15 SDOH — HEALTH STABILITY: MENTAL HEALTH: HAVE YOU USED ANY PRESCRIPTION DRUGS OTHER THAN PRESCRIBED IN THE PAST 12 MONTHS?: NO

## 2024-02-15 SDOH — HEALTH STABILITY: MENTAL HEALTH: HAVE YOU USED ANY SUBSTANCES (CANABIS, COCAINE, HEROIN, HALLUCINOGENS, INHALANTS, ETC.) IN THE PAST 12 MONTHS?: NO

## 2024-02-15 SDOH — SOCIAL STABILITY: SOCIAL INSECURITY: DO YOU FEEL ANYONE HAS EXPLOITED OR TAKEN ADVANTAGE OF YOU FINANCIALLY OR OF YOUR PERSONAL PROPERTY?: NO

## 2024-02-15 ASSESSMENT — ACTIVITIES OF DAILY LIVING (ADL)
FEEDING YOURSELF: INDEPENDENT
HEARING - LEFT EAR: FUNCTIONAL
PATIENT'S MEMORY ADEQUATE TO SAFELY COMPLETE DAILY ACTIVITIES?: YES
HEARING - RIGHT EAR: FUNCTIONAL
GROOMING: INDEPENDENT
BATHING: INDEPENDENT
JUDGMENT_ADEQUATE_SAFELY_COMPLETE_DAILY_ACTIVITIES: YES
DRESSING YOURSELF: INDEPENDENT
TOILETING: INDEPENDENT
WALKS IN HOME: INDEPENDENT
ADEQUATE_TO_COMPLETE_ADL: YES

## 2024-02-15 ASSESSMENT — PAIN SCALES - GENERAL
PAINLEVEL_OUTOF10: 10 - WORST POSSIBLE PAIN
PAINLEVEL_OUTOF10: 10 - WORST POSSIBLE PAIN
PAINLEVEL_OUTOF10: 0 - NO PAIN
PAINLEVEL_OUTOF10: 6
PAINLEVEL_OUTOF10: 9
PAINLEVEL_OUTOF10: 9

## 2024-02-15 ASSESSMENT — LIFESTYLE VARIABLES
AUDIT-C TOTAL SCORE: 0
HOW MANY STANDARD DRINKS CONTAINING ALCOHOL DO YOU HAVE ON A TYPICAL DAY: PATIENT DOES NOT DRINK
HOW OFTEN DO YOU HAVE A DRINK CONTAINING ALCOHOL: NEVER
SKIP TO QUESTIONS 9-10: 1
HOW OFTEN DO YOU HAVE 6 OR MORE DRINKS ON ONE OCCASION: NEVER
AUDIT-C TOTAL SCORE: 0

## 2024-02-15 ASSESSMENT — PATIENT HEALTH QUESTIONNAIRE - PHQ9
SUM OF ALL RESPONSES TO PHQ9 QUESTIONS 1 & 2: 0
1. LITTLE INTEREST OR PLEASURE IN DOING THINGS: NOT AT ALL
2. FEELING DOWN, DEPRESSED OR HOPELESS: NOT AT ALL

## 2024-02-15 NOTE — PROGRESS NOTES
Patient s/p epidural but having pain LLQ with contractions.  VE: anterior lip/0.  FHR: 120s with moderate variability and accels. Category I.  Ctxs:  q 2 min.  P: Anesthesia present to redo epidural.

## 2024-02-15 NOTE — ANESTHESIA PROCEDURE NOTES
Epidural Block    Patient location during procedure: OB  Start time: 2/15/2024 3:11 PM  End time: 2/15/2024 3:18 PM  Reason for block: labor analgesia  Staffing  Performed: CRNA   Authorized by: KIN Napoles    Performed by: KIN Napoles    Preanesthetic Checklist  Completed: patient identified, IV checked, risks and benefits discussed, surgical consent, pre-op evaluation, timeout performed and sterile techniques followed  Block Timeout  RN/Licensed healthcare professional reads aloud to the Anesthesia provider and entire team: Patient identity, procedure with side and site, patient position, and as applicable the availability of implants/special equipment/special requirements.  Patient on coagulant treatment: no  Timeout performed at: 2/15/2024 3:15 PM  Block Placement  Patient position: sitting  Prep: ChloraPrep  Sterility prep: drape, gloves, hand, mask and cap  Sedation level: no sedation  Patient monitoring: blood pressure, continuous pulse oximetry, EKG and heart rate  Approach: midline  Local numbing: lidocaine 1% to skin and subcutaneous tissues  Vertebral space: lumbar  Lumbar location: L3-L4  Epidural  Loss of resistance technique: saline  Guidance: landmark technique        Needle  Needle type: Tuohy   Needle gauge: 19  Needle length: 8.9cm  Needle insertion depth: 7 cm  Catheter type: multi-orifice  Catheter size: 20 G  Catheter at skin depth: 12 cm  Catheter securement method: clear occlusive dressing and liquid medical adhesive    Test dose: lidocaine 1.5% with epinephrine 1-to-200,000  Test dose: lidocaine 1.5% with epinephrine 1-to-200,000  Test dose result: no positive test dose            Assessment  Sensory level: T10 bilateral  Block outcome: patient comfortable  Number of attempts: 1  Events: no positive test dose  Procedure assessment: patient tolerated procedure well with no immediate complications  Additional Notes  Site oozing.

## 2024-02-15 NOTE — ANESTHESIA PROCEDURE NOTES
Epidural Block    Patient location during procedure: OB  Reason for block: labor analgesia  Staffing  Performed: CRNA   Authorized by: KIN Tijerina    Performed by: KIN Tijerina    Preanesthetic Checklist  Completed: patient identified, IV checked, risks and benefits discussed, surgical consent, pre-op evaluation, timeout performed and sterile techniques followed  Block Timeout  RN/Licensed healthcare professional reads aloud to the Anesthesia provider and entire team: Patient identity, procedure with side and site, patient position, and as applicable the availability of implants/special equipment/special requirements.  Patient on coagulant treatment: yes  Timeout performed at: 2/15/2024 5:15 PM  Block Placement  Patient position: sitting  Prep: Betadine  Sterility prep: gloves, mask and drape  Sedation level: no sedation  Patient monitoring: continuous pulse oximetry, blood pressure and heart rate  Approach: midline  Local numbing: lidocaine 1% to skin and subcutaneous tissues  Vertebral space: lumbar  Lumbar location: L2-L3  Epidural  Loss of resistance technique: air  Guidance: landmark technique        Needle  Needle type: Tuohy   Needle gauge: 17  Catheter type: multi-orifice  Catheter at skin depth: 11 cm  Catheter securement method: clear occlusive dressing and surgical tape    Test dose: lidocaine 1.5% with epinephrine 1-to-200,000  Test dose: lidocaine 1.5% with epinephrine 1-to-200,000  Test dose result: no positive test dose            Assessment  Sensory level: T6  Block outcome: pain improved  Number of attempts: 1  Events: no positive test dose  Additional Notes  Patient with persistent labor pains that did not improve with withdrawal of catheter by 2 cm and re-bolus.  She did elect for me to replace epidural.  Epidural placement was challenging at L3/4 complicated by bony prominences.  L2/3 was straight forward and uncomplicated.  Patient got good relief of labor pains  following re-bolus via new epidural.

## 2024-02-15 NOTE — PROGRESS NOTES
Subjective   Patient ID [id number]   Vonda Francis is a 28 y.o.  at 38w4d with a working estimated date of delivery of 2024, by Ultrasound who presents for a routine prenatal visit. She denies vaginal bleeding, decreased fetal movements, or contractions.    She reports a gush of fluid around 08:00 that has persisted with ongoing gushes of fluid since then.    Her pregnancy is complicated by:  Hx GDM/GHTN  IVF conception  Subchorionic bleed  Depression    Objective   Physical Exam:   Weight: 85.3 kg (188 lb)  Expected Total Weight Gain: 7 kg (15 lb)-11.5 kg (25 lb)   Pregravid BMI: 28.32  BP: 122/78                  Prenatal Labs  Urine Dip:  Lab Results   Component Value Date    KETONESU NEGATIVE 2024     Lab Results   Component Value Date    HGB 8.6 (L) 01/10/2024    HCT 29.0 (L) 01/10/2024    ABO CANCELED 2023    ABO O 2023    HEPBSAG NONREACTIVE 2023           Imaging: vertex at 36 weeks    Assessment/Plan   Problem List Items Addressed This Visit    None  Visit Diagnoses         Codes    Labor without complication    -  Primary O80    SROM confirmed  Sent to L&D     38 weeks gestation of pregnancy     Z3A.38    Relevant Orders    POCT UA Automated manually resulted (Completed)          Continue prenatal vitamin.  Labs reviewed.  GBS neg  Expected mode of delivery   Eneida Joe DO

## 2024-02-15 NOTE — ANESTHESIA PREPROCEDURE EVALUATION
Patient: Vonda Francis    Evaluation Method: In-person visit    Procedure Information    Date: 02/15/24  Procedure: Labor Analgesia         Relevant Problems   Anesthesia (within normal limits)      Cardiovascular (within normal limits)   (+) Gestational hypertension (Resolved)      Endocrine   (+) Gestational diabetes mellitus in pregnancy, diet controlled (Resolved)   (+) Hypothyroidism   (+) Vitamin D deficiency      GI   (+) GERD (gastroesophageal reflux disease)      /Renal (within normal limits)      Neuro/Psych (within normal limits)      Pulmonary (within normal limits)      GI/Hepatic (within normal limits)      Hematology   (+) Iron deficiency anemia      Musculoskeletal (within normal limits)      Eyes, Ears, Nose, and Throat (within normal limits)      Infectious Disease   (+) Other impetigo      Genitourinary   (+) Female infertility      Genitourinary and Reproductive   (+) Ovarian hyperstimulation syndrome       Clinical information reviewed:   Tobacco  Allergies  Meds   Med Hx  Surg Hx   Fam Hx  Soc Hx        NPO Detail:  NPO/Void Status  Date of Last Liquid: 02/14/24  Time of Last Liquid: 1900  Date of Last Solid: 02/14/24  Time of Last Solid: 1900         OB/Gyn Evaluation    Present Pregnancy    Patient is pregnant now.   Obstetric History                Physical Exam    Airway  Mallampati: III  TM distance: >3 FB  Neck ROM: full     Cardiovascular   Rhythm: regular  Rate: normal     Dental - normal exam     Pulmonary - normal exam     Abdominal            Anesthesia Plan    History of general anesthesia?: yes  History of complications of general anesthesia?: no    ASA 2     epidural     The patient is not a current smoker.  Patient did not smoke on day of procedure.    Anesthetic plan and risks discussed with patient.  Use of blood products discussed with patient who consented to blood products.    Plan discussed with CRNA.

## 2024-02-15 NOTE — PATIENT INSTRUCTIONS
You were seen in the office today for routine OB care and had normal findings on exam. You are in labor with your water broken.  Go to Labor and Delivery at [facility]   Continue taking prenatal vitamins   Avoid sick contacts and consider getting your Flu (available in office during flu season) and COVID vaccines to protect against infection in pregnancy  We recommend getting the Tdap (available in office) and RSV vaccines to pass some immunity from mother to baby and protect your baby against RSV and Whooping cough in the first few months of life. Tdap can be given between 27 and 36 weeks, and RSV vaccinations can be given between 32 and 36 weeks gestation  Make an appointment for routine care in the office in the next 2 weeks  If you are having any painful contractions, vaginal bleeding, leaking amniotic fluid, or decrease in baby's movements prior to your next visit please call the office to speak to the physician on call. This includes after hours, weekends, and holidays, when the answering service will be able to connect you with the physician on call. [phone] ([place] Office) or [phone] ([place] Office).

## 2024-02-16 VITALS
TEMPERATURE: 97.9 F | RESPIRATION RATE: 18 BRPM | HEIGHT: 65 IN | BODY MASS INDEX: 31.22 KG/M2 | WEIGHT: 187.39 LBS | DIASTOLIC BLOOD PRESSURE: 73 MMHG | OXYGEN SATURATION: 100 % | HEART RATE: 96 BPM | SYSTOLIC BLOOD PRESSURE: 115 MMHG

## 2024-02-16 PROBLEM — Z34.90 TERM PREGNANCY (HHS-HCC): Status: RESOLVED | Noted: 2024-02-15 | Resolved: 2024-02-16

## 2024-02-16 PROBLEM — Z78.9 CONCEIVED BY IN VITRO FERTILIZATION: Status: RESOLVED | Noted: 2024-01-22 | Resolved: 2024-02-16

## 2024-02-16 PROBLEM — R11.2 NAUSEA AND VOMITING: Status: RESOLVED | Noted: 2023-10-13 | Resolved: 2024-02-16

## 2024-02-16 PROBLEM — N97.9 FEMALE INFERTILITY: Status: RESOLVED | Noted: 2023-10-13 | Resolved: 2024-02-16

## 2024-02-16 LAB
ERYTHROCYTE [DISTWIDTH] IN BLOOD BY AUTOMATED COUNT: 18.3 % (ref 11.5–14.5)
HCT VFR BLD AUTO: 28.5 % (ref 36–46)
HGB BLD-MCNC: 8.1 G/DL (ref 12–16)
MCH RBC QN AUTO: 19.7 PG (ref 26–34)
MCHC RBC AUTO-ENTMCNC: 28.4 G/DL (ref 32–36)
MCV RBC AUTO: 69 FL (ref 80–100)
NRBC BLD-RTO: 0.3 /100 WBCS (ref 0–0)
PLATELET # BLD AUTO: 181 X10*3/UL (ref 150–450)
RBC # BLD AUTO: 4.11 X10*6/UL (ref 4–5.2)
TREPONEMA PALLIDUM IGG+IGM AB [PRESENCE] IN SERUM OR PLASMA BY IMMUNOASSAY: NONREACTIVE
WBC # BLD AUTO: 13.6 X10*3/UL (ref 4.4–11.3)

## 2024-02-16 PROCEDURE — 2500000001 HC RX 250 WO HCPCS SELF ADMINISTERED DRUGS (ALT 637 FOR MEDICARE OP): Performed by: OBSTETRICS & GYNECOLOGY

## 2024-02-16 PROCEDURE — 36415 COLL VENOUS BLD VENIPUNCTURE: CPT | Performed by: OBSTETRICS & GYNECOLOGY

## 2024-02-16 PROCEDURE — 85027 COMPLETE CBC AUTOMATED: CPT | Performed by: OBSTETRICS & GYNECOLOGY

## 2024-02-16 RX ORDER — ACETAMINOPHEN 160 MG/5ML
975 SOLUTION ORAL EVERY 6 HOURS
Status: DISCONTINUED | OUTPATIENT
Start: 2024-02-16 | End: 2024-02-16 | Stop reason: HOSPADM

## 2024-02-16 RX ORDER — TRIPROLIDINE/PSEUDOEPHEDRINE 2.5MG-60MG
600 TABLET ORAL EVERY 6 HOURS
Status: DISCONTINUED | OUTPATIENT
Start: 2024-02-16 | End: 2024-02-16 | Stop reason: HOSPADM

## 2024-02-16 RX ORDER — IBUPROFEN 200 MG
600 TABLET ORAL EVERY 6 HOURS
COMMUNITY
Start: 2024-02-16

## 2024-02-16 RX ORDER — ALUMINUM HYDROXIDE, MAGNESIUM HYDROXIDE, AND SIMETHICONE 1200; 120; 1200 MG/30ML; MG/30ML; MG/30ML
30 SUSPENSION ORAL 4 TIMES DAILY PRN
Status: DISCONTINUED | OUTPATIENT
Start: 2024-02-16 | End: 2024-02-16 | Stop reason: HOSPADM

## 2024-02-16 RX ADMIN — ACETAMINOPHEN 1000 MG: 650 SOLUTION ORAL at 06:57

## 2024-02-16 RX ADMIN — ACETAMINOPHEN 1300 MG: 650 SOLUTION ORAL at 13:03

## 2024-02-16 RX ADMIN — ALUMINUM HYDROXIDE, MAGNESIUM HYDROXIDE, AND DIMETHICONE 30 ML: 200; 20; 200 SUSPENSION ORAL at 06:08

## 2024-02-16 ASSESSMENT — PAIN SCALES - GENERAL
PAINLEVEL_OUTOF10: 5 - MODERATE PAIN
PAINLEVEL_OUTOF10: 1
PAINLEVEL_OUTOF10: 3
PAINLEVEL_OUTOF10: 0 - NO PAIN

## 2024-02-16 ASSESSMENT — PAIN DESCRIPTION - LOCATION: LOCATION: ABDOMEN

## 2024-02-16 ASSESSMENT — ACTIVITIES OF DAILY LIVING (ADL): LACK_OF_TRANSPORTATION: NO

## 2024-02-16 NOTE — CARE PLAN
Problem: Pain  Goal: My pain/discomfort is manageable  Outcome: Met     Problem: Safety  Goal: Patient will be injury free during hospitalization  Outcome: Met  Goal: I will remain free of falls  Outcome: Met     Problem: Daily Care  Goal: Daily care needs are met  Outcome: Met     Problem: Psychosocial Needs  Goal: Demonstrates ability to cope with hospitalization/illness  Outcome: Met  Goal: Collaborate with me, my family, and caregiver to identify my specific goals  Outcome: Met  Flowsheets (Taken 2024 3839)  Cultural Requests During Hospitalization: none  Spiritual Requests During Hospitalization: none     Problem: Discharge Barriers  Goal: My discharge needs are met  Outcome: Met   The patient's goals for the shift include VSS; care of self and     The clinical goals for the shift include stable bleeding; VSS

## 2024-02-16 NOTE — L&D DELIVERY NOTE
OB Delivery Note  2024  Vonda Francis  28 y.o.   Vaginal, Spontaneous        Gestational Age: 38w4d  /Para:   Quantitative Blood Loss: Admission to Discharge: 300 mL (2/15/2024 10:55 AM - 2024  9:04 AM)    Marcela Francis [[id number]]      Labor Events     labor?: No  Rupture date/time: 2/15/2024 0800  Rupture type: Spontaneous  Fluid color: Clear  Fluid odor: None  Labor type: Spontaneous Onset of Labor  Complications: None       Labor Event Times    Labor onset date/time: 2/15/2024 0800  Dilation complete date/time: 2/15/2024 180  Start pushing date/time: 2/15/2024 182       Labor Length    1st stage: 10h 05m  2nd stage: 1h 03m  3rd stage: 0h 03m       Placenta    Placenta delivery date/time: 2/15/2024 1911  Placenta removal: Spontaneous  Placenta appearance: Intact  Placenta disposition: discarded       Cord    Vessels: 3 vessels  Complications: Nuchal  Nuchal intervention: reduced  Nuchal cord description: loose nuchal cord  Number of loops: 1  Cord blood disposition: Lab  Gases sent?: No  Stem cell collection (by provider): No       Lacerations    Perineal laceration: 2nd  Repair suture: 3-0 Synthetic Suture       Anesthesia    Method: Epidural       Operative Delivery    Forceps attempted?: No  Vacuum extractor attempted?: No       Shoulder Dystocia    Shoulder dystocia present?: Yes  Anterior shoulder: left  Time recognized: 2/15/2024 19:07:46  Gentle attempt at traction, assisted by maternal expulsive forces?: Yes   First maneuver: Cyndy maneuver  Second maneuver: suprapubic pressure  Third maneuver: delivery of posterior arm       [place] Delivery    Birth date/time: 2/15/2024 19:08:00  Delivery type: Vaginal, Spontaneous  Complications: None       Resuscitation    Method: None       Apgars    Living status: Living  Apgar Component Scores:  1 min.:  5 min.:  10 min.:  15 min.:  20 min.:    Skin color:  1  1       Heart rate:  2  2       Reflex irritability:   2  2       Muscle tone:  2  2       Respiratory effort:  2  2       Total:  9  9       Apgars assigned by: ALAN NOWAK RN AND TRINIDAD GURROLA RN       Delivery Providers    Delivering clinician: Mackenzie Boles MD   Provider Role    Kalpana Nowak, RN Delivery Nurse    Fabienne Gurrola, RN Nursery Nurse     Resident             While patient was pushing she became symptomatic with SOB and dizziness. As her HGB was 7.4 a unit of blood was ordered. Patient pushed and after delivery of the head had a nuchal cord that was reduced. A shoulder dystocia occurred with the left shoulder anterior. Mac Sotomayor, suprapubic pressure and delivery of the right posterior arm resulted in delivery. Delayed cord clamping was performed, the cord was clamped and cut. Cord blood obtained. The placenta was expressed and delivered intact. A second degree laceration was repaired with 3-0 Vicryl suture. QBL was 300cc. Counts were correct.    Mackenzie Boles MD

## 2024-02-16 NOTE — ANESTHESIA POSTPROCEDURE EVALUATION
Patient: Vonda Francis    Procedure Summary       Date: 02/15/24 Room / Location:     Anesthesia Start: 1511 Anesthesia Stop: 1908    Procedure: Labor Analgesia Diagnosis:     Scheduled Providers:  Responsible Provider: KIN Tijerina    Anesthesia Type: epidural ASA Status: 2            Anesthesia Type: epidural    Vitals Value Taken Time   /57 02/16/24 0628   Temp 36.6 02/16/24 0628   Pulse 96 02/16/24 0628   Resp 18 02/16/24 0628   SpO2 99 02/16/24 0628       Anesthesia Post Evaluation    Patient participation: complete - patient participated  Level of consciousness: awake  Pain management: satisfactory to patient  Airway patency: patent  Cardiovascular status: acceptable  Respiratory status: acceptable  Hydration status: acceptable  Postoperative Nausea and Vomiting: none        No notable events documented.

## 2024-02-16 NOTE — DISCHARGE SUMMARY
Discharge Summary    Admission Date: 2/15/2024  Discharge Date: 2024    Discharge Diagnosis  Term pregnancy  S/p   Iron deficiency anemia    Hospital Course  Delivery Date: 2/15/2024  7:08 PM   Delivery type: Vaginal, Spontaneous    GA at delivery: 38w4d  Outcome: Living   Anesthesia during delivery: Epidural   Intrapartum complications: None   Feeding method: Breastfeeding Status: Unknown    27 yo  38 4/7 weeks admitted in labor.   Anemia on admission hgb 7.4  S/p   Recd blood transfusion 1 unit.  Asymptomatic. Repeat hgb 8.1. Discharged home on iron supplementation.    Discharge Meds     Your medication list        START taking these medications        Instructions Last Dose Given Next Dose Due   ibuprofen 200 mg tablet      Take 3 tablets (600 mg) by mouth every 6 hours.              CONTINUE taking these medications        Instructions Last Dose Given Next Dose Due   acetaminophen 160 mg/5 mL liquid  Commonly known as: Tylenol           levothyroxine 25 mcg tablet  Commonly known as: Synthroid, Levoxyl           PRENATAL 2-IRON-FOLIC ACID-OM3 ORAL           sertraline 25 mg tablet  Commonly known as: Zoloft      Take 1 tablet (25 mg) by mouth once daily.                 Where to Get Your Medications        You can get these medications from any pharmacy    You don't need a prescription for these medications  ibuprofen 200 mg tablet          Test Results Pending At Discharge  Pending Labs       No current pending labs.            Outpatient Follow-Up  Future Appointments   Date Time Provider Department Center   2024  9:00 PM [facility] L&D PROCEDURE ROOM 1 [address]   2024  7:30 AM [facility] L&D PROCEDURE ROOM 1 [address]           Yesica Anne MD

## 2024-02-17 LAB
BLOOD EXPIRATION DATE: NORMAL
BLOOD EXPIRATION DATE: NORMAL
DISPENSE STATUS: NORMAL
DISPENSE STATUS: NORMAL
PRODUCT BLOOD TYPE: 5100
PRODUCT BLOOD TYPE: 5100
PRODUCT CODE: NORMAL
PRODUCT CODE: NORMAL
UNIT ABO: NORMAL
UNIT ABO: NORMAL
UNIT NUMBER: NORMAL
UNIT NUMBER: NORMAL
UNIT RH: NORMAL
UNIT RH: NORMAL
UNIT VOLUME: 350
UNIT VOLUME: 400
XM INTEP: NORMAL
XM INTEP: NORMAL

## 2024-02-17 NOTE — CARE PLAN
Patient vitals and assessment stable at the time of discharge. Discharge instructions reviewed with patient. Discharged home at 2015 with significant other and infant.

## 2024-02-18 ENCOUNTER — APPOINTMENT (OUTPATIENT)
Dept: OBSTETRICS AND GYNECOLOGY | Facility: HOSPITAL | Age: 29
End: 2024-02-18
Payer: COMMERCIAL

## 2024-02-19 ENCOUNTER — APPOINTMENT (OUTPATIENT)
Dept: OBSTETRICS AND GYNECOLOGY | Facility: HOSPITAL | Age: 29
End: 2024-02-19
Payer: COMMERCIAL

## 2024-02-19 ENCOUNTER — APPOINTMENT (OUTPATIENT)
Dept: OBSTETRICS AND GYNECOLOGY | Facility: CLINIC | Age: 29
End: 2024-02-19
Payer: COMMERCIAL

## 2024-09-19 ENCOUNTER — TELEMEDICINE (OUTPATIENT)
Dept: ENDOCRINOLOGY | Facility: CLINIC | Age: 29
End: 2024-09-19
Payer: COMMERCIAL

## 2024-09-19 ENCOUNTER — DOCUMENTATION (OUTPATIENT)
Dept: ENDOCRINOLOGY | Facility: CLINIC | Age: 29
End: 2024-09-19

## 2024-09-19 DIAGNOSIS — N91.5 OLIGOMENORRHEA, UNSPECIFIED TYPE: ICD-10-CM

## 2024-09-19 DIAGNOSIS — Z11.3 SCREENING FOR STDS (SEXUALLY TRANSMITTED DISEASES): ICD-10-CM

## 2024-09-19 DIAGNOSIS — Z01.812 ENCOUNTER FOR PREPROCEDURAL LABORATORY EXAMINATION: ICD-10-CM

## 2024-09-19 DIAGNOSIS — Z11.59 ENCOUNTER FOR SCREENING FOR OTHER VIRAL DISEASES: ICD-10-CM

## 2024-09-19 DIAGNOSIS — Z31.41 FERTILITY TESTING: ICD-10-CM

## 2024-09-19 DIAGNOSIS — Z13.1 SCREENING FOR DIABETES MELLITUS: ICD-10-CM

## 2024-09-19 DIAGNOSIS — Z01.83 ENCOUNTER FOR RH BLOOD TYPING: ICD-10-CM

## 2024-09-19 DIAGNOSIS — Z13.29 SCREENING FOR THYROID DISORDER: Primary | ICD-10-CM

## 2024-09-19 PROCEDURE — 1036F TOBACCO NON-USER: CPT | Performed by: NURSE PRACTITIONER

## 2024-09-19 PROCEDURE — 99213 OFFICE O/P EST LOW 20 MIN: CPT | Performed by: NURSE PRACTITIONER

## 2024-09-19 NOTE — PROGRESS NOTES
Virtual or Telephone Consent: An interactive audio and video telecommunication system which permits real time communications between the patient (at the originating site) and provider (at the distant site) was utilized to provide this telehealth service    Follow Up Visit HPI    Patient is a 29 y.o.  female with wanting to do another FET presenting today for follow up visit.     Patient thinks she has 6 embryos with us.    Has transferred 2 embryos both transfers and conceived 1 baby each time.     Delivered 2/15/24- gestational diabetes and ghtn   Not breastfeeding    Cycles are a little irregular. LMP 9/15  Some months she has missed. Has had this previously.    Testing to date:   Result Date Done   TSH: 2.13 (Ref range: 0.44 - 3.98 mIU/L) 2023   AMH: 13.6 (H; Ref range: ng/mL) 9/10/2020   PRL: No results found for requested labs within last 365 days. No results found for requested labs within last 365 days.   Testosterone: No results found for requested labs within last 365 days. No results found for requested labs within last 365 days.   DHEAS: No results found for requested labs within last 365 days. No results found for requested labs within last 365 days.   Other: NA    Hysterosalpingogram: bilateral salpingectomy .  Saline Infused Sonography: na  GYN Pelvic Ultrasound: na    Partner SA: NA    Treatment to date: IVF  Programmed FET.      Past Medical History:   Diagnosis Date    Acid reflux     Encounter for gynecological examination (general) (routine) without abnormal findings 2016    Well woman exam with routine gynecological exam    Encounter for screening for infections with a predominantly sexual mode of transmission 2016    Screen for sexually transmitted diseases    Female infertility     Gestational diabetes (HHS-HCC)     Gestational hypertension (HHS-HCC)     Other nail disorders 2016    Nail deformity    Personal history of diseases of the skin and subcutaneous  tissue 2015    History of paronychia of finger    Personal history of diseases of the skin and subcutaneous tissue 2016    History of folliculitis    Personal history of other infectious and parasitic diseases 2016    History of chlamydia infection    Personal history of other infectious and parasitic diseases 2016    History of chlamydia infection    Plantar wart 2016    Bilateral plantar wart    Plantar wart 2016    Plantar wart, left foot     Past Surgical History:   Procedure Laterality Date    SALPINGECTOMY Bilateral      Current Outpatient Medications on File Prior to Visit   Medication Sig Dispense Refill    acetaminophen (Tylenol) 160 mg/5 mL liquid Take 650 mg by mouth every 4 hours if needed for mild pain (1 - 3).      ibuprofen 200 mg tablet Take 3 tablets (600 mg) by mouth every 6 hours.      levothyroxine (Synthroid, Levoxyl) 25 mcg tablet Take 1 tablet (25 mcg) by mouth once daily.      PRENATAL 2-IRON-FOLIC ACID-OM3 ORAL Take by mouth once daily.      sertraline (Zoloft) 25 mg tablet Take 1 tablet (25 mg) by mouth once daily. 30 tablet 0     No current facility-administered medications on file prior to visit.     No medications currently.    BMI:   BMI Readings from Last 1 Encounters:   24 31.22 kg/m²     VITALS:  There were no vitals taken for this visit.  LMP: No LMP recorded.    ASSESSMENT   29 y.o.  female with wanting to do an FET and the following pertinent medical issues: history of bilateral salpingectomy, hx of ghtn and gDM .    COUNSELING      FET NP Teaching Performed  Reviewed plan for upcoming FET.   Reviewed lining check. Reviewed estrogen priming and progesterone.   Reviewed need for ultrasound monitoring. Reviewed cancelled cycles, sometimes multiple to achieve optimal lining.   Reviewed possibility that embryos might not survive thaw and need for signed thaw plan.   All questions answered.     Programmed : estrace 6mg  LETA 75mg IM      [X ] Hysteroscopy  for cavity evaluation  We have agree to transfer: 2 embryos- Will need to discuss with MD.    Patient is aware of risks of multiples and higher risk pregnancy. Patient is aware ASRM recommendation is for 1 embryo.    Jerica Blanco CNP    Routine Testing  Fertility Center  STDs Within 1 year   Genetic carrier Waiver/Completed   T&S Within 1 year   AMH Within 1 year   TSH Within 1 year   Rubella/Varicella Within 5 years     BMI Testing  Fertility Center  CBC Within 1 year   CMP Within 1 year   HgbA1c Within 1 year   Mag, Phos, Vit D <18 Within 1 year   MFM > 40  REQ   Wt loss consult > 40 OPT     PLAN  Orders Placed This Encounter   Procedures    Hysteroscopy diagnostic    TSH with reflex to Free T4 if abnormal    Prolactin    Testosterone,Free and Total    Dhea-Sulfate    Hemoglobin A1C    Type And Screen    Rubella Antibody, Igg    Varicella Zoster Antibody, Igg    Hepatitis B surface antigen    Hepatitis C Antibody    HIV 1/2 Antigen/Antibody Screen with Reflex to Confirmation    Syphilis Screen with Reflex    C. Trachomatis / N. Gonorrhoeae, Amplified Detection    POCT pregnancy, urine manually resulted       FOLLOW UP   Consults:  NA  Engaged MD  Take prenatal vitamins, vitamin D 2000 IUs daily  Discussed that treatment cannot proceed until checklist items are complete.   Additional testing for BMI < 18 or > 40: No.  Advised patient to arrange this now with the .  Chart to primary nurse for care coordination and patient check list/education.  Would prefer a female for transfer- Dr. Messina, Dr. Loza, Dr. Vargas.- history of trauma.  Will also need to verify with Dr. Messina ok to transfer 2 embryos.     MD Completion:  Ectopic Risk: Yes  Medically Complex: No  Outstanding boarding pass items: labs and hysteroscopy      Jerica STORM Francis  09/19/2024  2:01 PM

## 2024-09-23 ENCOUNTER — PREP FOR PROCEDURE (OUTPATIENT)
Dept: ENDOCRINOLOGY | Facility: CLINIC | Age: 29
End: 2024-09-23

## 2024-09-23 ENCOUNTER — HOSPITAL ENCOUNTER (OUTPATIENT)
Dept: ENDOCRINOLOGY | Facility: CLINIC | Age: 29
Discharge: HOME | End: 2024-09-23
Payer: COMMERCIAL

## 2024-09-23 VITALS
BODY MASS INDEX: 30.43 KG/M2 | WEIGHT: 171.74 LBS | OXYGEN SATURATION: 98 % | DIASTOLIC BLOOD PRESSURE: 80 MMHG | SYSTOLIC BLOOD PRESSURE: 119 MMHG | RESPIRATION RATE: 20 BRPM | TEMPERATURE: 98.1 F | HEIGHT: 63 IN | HEART RATE: 72 BPM

## 2024-09-23 DIAGNOSIS — Z31.41 FERTILITY TESTING: ICD-10-CM

## 2024-09-23 LAB — PREGNANCY TEST URINE, POC: NEGATIVE

## 2024-09-23 PROCEDURE — 58555 HYSTEROSCOPY DX SEP PROC: CPT | Performed by: OBSTETRICS & GYNECOLOGY

## 2024-09-23 PROCEDURE — 64435 NJX AA&/STRD PARACRV NRV: CPT | Mod: 59 | Performed by: OBSTETRICS & GYNECOLOGY

## 2024-09-23 PROCEDURE — 7100000009 HC PHASE TWO TIME - INITIAL BASE CHARGE

## 2024-09-23 PROCEDURE — 7100000010 HC PHASE TWO TIME - EACH INCREMENTAL 1 MINUTE

## 2024-09-23 RX ORDER — ACETAMINOPHEN 325 MG/1
650 TABLET ORAL ONCE AS NEEDED
Status: DISCONTINUED | OUTPATIENT
Start: 2024-09-23 | End: 2024-09-24 | Stop reason: HOSPADM

## 2024-09-23 RX ORDER — LIDOCAINE HYDROCHLORIDE 10 MG/ML
10 INJECTION, SOLUTION EPIDURAL; INFILTRATION; INTRACAUDAL; PERINEURAL ONCE
Status: CANCELLED | OUTPATIENT
Start: 2024-09-23

## 2024-09-23 RX ORDER — LIDOCAINE HYDROCHLORIDE 10 MG/ML
10 INJECTION, SOLUTION EPIDURAL; INFILTRATION; INTRACAUDAL; PERINEURAL ONCE
Status: DISCONTINUED | OUTPATIENT
Start: 2024-09-23 | End: 2024-09-24 | Stop reason: HOSPADM

## 2024-09-23 RX ORDER — ACETAMINOPHEN 325 MG/1
650 TABLET ORAL ONCE AS NEEDED
Status: CANCELLED | OUTPATIENT
Start: 2024-09-23

## 2024-09-23 ASSESSMENT — PAIN - FUNCTIONAL ASSESSMENT
PAIN_FUNCTIONAL_ASSESSMENT: 0-10
PAIN_FUNCTIONAL_ASSESSMENT: 0-10

## 2024-09-23 ASSESSMENT — PAIN SCALES - GENERAL
PAINLEVEL_OUTOF10: 0 - NO PAIN
PAINLEVEL_OUTOF10: 0 - NO PAIN

## 2024-09-23 NOTE — PROGRESS NOTES
Patient ID: Vonda Francis is a 29 y.o. female.    Hysteroscopy diagnostic    Date/Time: 9/23/2024 2:55 PM    Performed by: Samaria Vargas MD  Authorized by: ABDULAZIZ Vo-CNP    Consent:     Consent obtained:  Verbal and written    Consent given by:  Patient    Risks, benefits, and alternatives were discussed: yes      Risks discussed:  Bleeding, infection and pain  Universal protocol:     Procedure explained and questions answered to patient or proxy's satisfaction: yes      Relevant documents present and verified: yes      Test results available: yes      Imaging studies available: yes      Required blood products, implants, devices, and special equipment available: yes      Immediately prior to procedure, a time out was called: yes      Patient identity confirmed:  Verbally with patient, arm band and hospital-assigned identification number  Pre-procedure details:     Skin preparation:  Povidone-iodine  Procedure specific details:      Procedure: Diagnostic Hysteroscopy   Preop diagnosis: Fertility testing  Post op diagnosis: Same   Assistant: None    Anesthesia: None   IV: None   EBL: 3 cc  Specimens: None   Complications: None   Risks benefits and alternatives of the procedure explained to the patient and informed consent was obtained. Urine pregnancy test was performed and was negative. Time out was performed. The patient was placed in the dorsal lithotomy position and a sterile speculum was placed in the vagina. The cervix was sterilized with Betadine x3. Paracervical block with lidocaine 1% was administered.   Tenaculum: No  Dilation: No  The hysteroscope was placed in the cervix and advanced into the uterine cavity. Normal saline was used for distension media. Images were obtained and findings noted as below.   All instruments were then removed. Good hemostasis was noted. Patient tolerated the procedure well returned to the recovery area in stable condition. .   Findings:   Cavity: Smooth  cavity no lesions noted  Ostia: Bilateral tubal ostia visualized  Additional Notes:  Samaria Vargas 09/23/24 2:55 PM            Post-procedure details:     Procedure completion:  Tolerated well, no immediate complications

## 2024-09-30 ENCOUNTER — LAB (OUTPATIENT)
Dept: LAB | Facility: LAB | Age: 29
End: 2024-09-30
Payer: COMMERCIAL

## 2024-09-30 DIAGNOSIS — N91.5 OLIGOMENORRHEA, UNSPECIFIED TYPE: ICD-10-CM

## 2024-09-30 DIAGNOSIS — Z01.83 ENCOUNTER FOR RH BLOOD TYPING: ICD-10-CM

## 2024-09-30 DIAGNOSIS — Z13.1 SCREENING FOR DIABETES MELLITUS: ICD-10-CM

## 2024-09-30 DIAGNOSIS — Z13.29 SCREENING FOR THYROID DISORDER: ICD-10-CM

## 2024-09-30 DIAGNOSIS — Z11.59 ENCOUNTER FOR SCREENING FOR OTHER VIRAL DISEASES: ICD-10-CM

## 2024-09-30 DIAGNOSIS — Z11.3 SCREENING FOR STDS (SEXUALLY TRANSMITTED DISEASES): ICD-10-CM

## 2024-09-30 LAB — TSH SERPL-ACNC: 1.61 MIU/L (ref 0.44–3.98)

## 2024-09-30 PROCEDURE — 83036 HEMOGLOBIN GLYCOSYLATED A1C: CPT

## 2024-09-30 PROCEDURE — 86803 HEPATITIS C AB TEST: CPT

## 2024-09-30 PROCEDURE — 36415 COLL VENOUS BLD VENIPUNCTURE: CPT

## 2024-09-30 PROCEDURE — 84402 ASSAY OF FREE TESTOSTERONE: CPT

## 2024-09-30 PROCEDURE — 86317 IMMUNOASSAY INFECTIOUS AGENT: CPT

## 2024-09-30 PROCEDURE — 86787 VARICELLA-ZOSTER ANTIBODY: CPT

## 2024-09-30 PROCEDURE — 87340 HEPATITIS B SURFACE AG IA: CPT

## 2024-09-30 PROCEDURE — 82627 DEHYDROEPIANDROSTERONE: CPT

## 2024-09-30 PROCEDURE — 84146 ASSAY OF PROLACTIN: CPT

## 2024-10-01 LAB
DHEA-S SERPL-MCNC: 140 UG/DL (ref 65–395)
EST. AVERAGE GLUCOSE BLD GHB EST-MCNC: 108 MG/DL
HBA1C MFR BLD: 5.4 %
HBV SURFACE AG SERPL QL IA: NONREACTIVE
HCV AB SER QL: NONREACTIVE
PROLACTIN SERPL-MCNC: 5.1 UG/L (ref 3–20)
RUBV IGG SERPL IA-ACNC: 0.7 IA
RUBV IGG SERPL QL IA: NEGATIVE
VARICELLA ZOSTER IGG INDEX: 0.7 IA
VZV IGG SER QL IA: NEGATIVE

## 2024-10-02 LAB
ABO GROUP (TYPE) IN BLOOD: NORMAL
ANTIBODY SCREEN: NORMAL
RH FACTOR (ANTIGEN D): NORMAL

## 2024-10-03 ENCOUNTER — TELEPHONE (OUTPATIENT)
Dept: ENDOCRINOLOGY | Facility: CLINIC | Age: 29
End: 2024-10-03
Payer: COMMERCIAL

## 2024-10-03 DIAGNOSIS — Z01.83 ENCOUNTER FOR RH BLOOD TYPING: ICD-10-CM

## 2024-10-03 NOTE — TELEPHONE ENCOUNTER
Called patient to let her know to repeat type and screen. Patient will plan to go tomorrow or sometime next week.     Chelita Guthrie 10/03/24 2:41 PM

## 2024-10-03 NOTE — TELEPHONE ENCOUNTER
Reason for call:  lab called to let us know that Type and screen Antibody identification was canceled they did not have enough sample.  Notes:

## 2024-10-04 ENCOUNTER — TELEPHONE (OUTPATIENT)
Dept: ENDOCRINOLOGY | Facility: CLINIC | Age: 29
End: 2024-10-04

## 2024-10-04 ENCOUNTER — SPECIALTY PHARMACY (OUTPATIENT)
Dept: PHARMACY | Facility: CLINIC | Age: 29
End: 2024-10-04

## 2024-10-04 ENCOUNTER — LAB (OUTPATIENT)
Dept: LAB | Facility: LAB | Age: 29
End: 2024-10-04
Payer: COMMERCIAL

## 2024-10-04 DIAGNOSIS — Z31.83 ENCOUNTER FOR ASSISTED REPRODUCTIVE FERTILITY CYCLE: ICD-10-CM

## 2024-10-04 DIAGNOSIS — N97.9 FEMALE INFERTILITY: ICD-10-CM

## 2024-10-04 DIAGNOSIS — Z11.3 SCREENING FOR STDS (SEXUALLY TRANSMITTED DISEASES): ICD-10-CM

## 2024-10-04 DIAGNOSIS — Z01.83 ENCOUNTER FOR RH BLOOD TYPING: ICD-10-CM

## 2024-10-04 LAB
ABO GROUP (TYPE) IN BLOOD: NORMAL
ANTIBODY SCREEN: NORMAL
RH FACTOR (ANTIGEN D): NORMAL
TESTOSTERONE FREE (CHAN): 3 PG/ML (ref 0.1–6.4)
TESTOSTERONE,TOTAL,LC-MS/MS: 17 NG/DL (ref 2–45)

## 2024-10-04 PROCEDURE — 36415 COLL VENOUS BLD VENIPUNCTURE: CPT

## 2024-10-04 PROCEDURE — 86780 TREPONEMA PALLIDUM: CPT

## 2024-10-04 PROCEDURE — 87389 HIV-1 AG W/HIV-1&-2 AB AG IA: CPT

## 2024-10-04 RX ORDER — LIDOCAINE AND PRILOCAINE 25; 25 MG/G; MG/G
CREAM TOPICAL DAILY
Qty: 30 G | Refills: 2 | Status: ON HOLD | OUTPATIENT
Start: 2024-10-04 | End: 2024-10-10 | Stop reason: ENTERED-IN-ERROR

## 2024-10-04 RX ORDER — PROGESTERONE 50 MG/ML
75 INJECTION, SOLUTION INTRAMUSCULAR DAILY
Qty: 50 ML | Refills: 3 | Status: SHIPPED | OUTPATIENT
Start: 2024-10-04 | End: 2025-10-04

## 2024-10-04 RX ORDER — PROPYLENE GLYCOL/PEG 400 0.3 %-0.4%
DROPS OPHTHALMIC (EYE)
Qty: 30 EACH | Refills: 3 | Status: SHIPPED | OUTPATIENT
Start: 2024-10-04 | End: 2025-01-02

## 2024-10-04 RX ORDER — ESTRADIOL 2 MG/1
2 TABLET ORAL 2 TIMES DAILY
Start: 2024-10-04 | End: 2025-10-04

## 2024-10-04 RX ORDER — ESTRADIOL 2 MG/1
6 TABLET ORAL DAILY
Qty: 90 TABLET | Refills: 2 | Status: ON HOLD | OUTPATIENT
Start: 2024-10-04 | End: 2024-10-10 | Stop reason: ENTERED-IN-ERROR

## 2024-10-04 NOTE — PROGRESS NOTES
Boarding Pass Interval FET Checklist    Age: 29 y.o.    Patient thinks she has 6 embryos with us.     Has transferred 2 embryos both transfers and conceived 1 baby each time.      Delivered 2/15/24- gestational diabetes and ghtn   Not breastfeeding  Provider: MD Jerica Sutherland, CNP  Primary RN: Razia Flores  Reasons for Treatment: history of bilateral salpingectomy, hx of ghtn and gDM .   Last BMI  25 : 27.44 kg/m²       Past Medical History:   Diagnosis Date    Acid reflux     Acute hypoxic respiratory failure 10/09/2024    Aspiration into respiratory tract 10/09/2024    During dilation esophagus under sedation    Class 1 obesity with body mass index (BMI) of 30.0 to 30.9 in adult 10/18/2024    Eosinophilic esophagitis 10/29/2024    GI    Esophageal dysphagia 10/29/2024    Female infertility     Gastroesophageal reflux disease, unspecified whether esophagitis present     Gestational diabetes     Gestational hypertension ([facility])     History of chlamydia infection 2016    History of folliculitis 2016    History of paronychia of finger 2015    History of vitamin D deficiency     Hypothyroid     Iron deficiency anemia     Odynophagia     Other nail disorders 2016    Nail deformity    Plantar wart 2016    Plantar wart, left foot       Date Done Consultation Results/Comments   24 Medication Protocol Programmed : estrace 6mg  LETA 75mg IM     Would prefer a female for transfer- Dr. Messina, Dr. Loza, Dr. Vargas.- history of trauma.     UPDATED PROTOCOL (2025):  Lupron FET  Estrace 6 mg  LETA 75 mg IM   10/25/24 FET Treatment Plan Packet- ID REQ (Every cycle) Received and in chart: Yes (Razia Flores, RN)   10/17/24 IVF Information and Authorization - [employer]/offsite Storage (ID REQ) (Yearly) Received and in chart: Yes (Razia Flores RN)   25 [employer] Embryo- Couple or Single Packet (Yearly) Received and in chart: Yes (Razia GARCIA  JOSIANE Flores)   10/25/24  Waiver (In) Form Received and in chart: Yes (Razia Flores RN)    Embryo Ship In 2 Embryos ship in;    ID#   Embryo #1 [facility]   Embryo #2 [facility]- need to confirm with Dr. Naga erickson to transfer 2- confirmed on 10/7/24      10/4/24 Procedure Order Placed [x]   11/13/24 MFM Consult Okay to proceed?   In summary the following is recommended:     Antigen screening pattern for E antigen as above.  If positive will update recommendations for pregnancy surveillance.  If negative then no further follow up is required.     Thank you for allowing us to participate in the care of your patient.  At this time I see no contraindication to pregnancy and the above testing deos not need to be finalized before proceeding with pregnancy.     I spent 60 minutes in the professional and overall care of this patient.     Chavez Coulter MD  Maternal Fetal Medicine            Electronically signed by Chavez Coulter MD at 4/9/2025 10:37 AM      Psych Consult Okay to proceed? N/A    Genetics Consult Okay to proceed? N/A    Other    Date Done Female Labs Results/Comments   3/10/2025 T&S (Q 1 Year) ABO: O  Rh: POS  Antibody: NEG   9/30/2024 Hep B sAg Nonreactive   9/30/2024 Hep C AB Nonreactive   10/4/24 HIV Nonreactive    10/4/2024 Syphilis Nonreactive   10/28/24 GC/CT GC: negative   CT: negative    9/30/2024 Rubella (Q 5 Years) Negative   9/30/2024 Varicella (Q 5 Years) Negative   3/24/2025 TSH 0.83 (Ref range: mIU/L)   3/24/2025 HgbA1C 5.5 (Ref range: <5.7 % of total Hgb)    Uterine Cavity Eval HSG:   SIS:   Hyster: (9/23/2024) Procedure: Diagnostic Hysteroscopy   Preop diagnosis: Fertility testing  Post op diagnosis: Same   Assistant: None    Anesthesia: None   IV: None   EBL: 3 cc  Specimens: None   Complications: None   Risks benefits and alternatives of the procedure explained to the patient and informed consent was obtained. Urine pregnancy test was performed and was negative. Time  out was performed. The patient was placed in the dorsal lithotomy position and a sterile speculum was placed in the vagina. The cervix was sterilized with Betadine x3. Paracervical block with lidocaine 1% was administered.   Tenaculum: No  Dilation: No  The hysteroscope was placed in the cervix and advanced into the uterine cavity. Normal saline was used for distension media. Images were obtained and findings noted as below.   All instruments were then removed. Good hemostasis was noted. Patient tolerated the procedure well returned to the recovery area in stable condition. .   Findings:   Cavity: Smooth cavity no lesions noted  Ostia: Bilateral tubal ostia visualized  Additional Notes:  Samaria Farmercnidy 09/23/24 2:55 PM             2/12/2021 Pap Smear WNL   HPV: Negative    1/17/2018 Mammogram ( > 40) FINDINGS:  Ultrasound scanning of the site of the patient's palpable abnormality  which is the 9:00 position of the right breast 4 cm from the nipple  demonstrates dense fibroglandular tissue with some fat. No suspicious  mass or architectural distortion is evident. No hyperemia to the  tissues is evident.     IMPRESSION:  Other than fibroglandular tissue, no finding is evident to correlate  with the patient's palpable abnormality. Further management can be  determined on a clinical basis. If symptoms persist or progress,  repeat assessment can be undertaken.     BI-RADS CATEGORY:  BI-RADS CATEGORY: 1, negative. Follow-up as clinically warranted.   Date Done Miscellaneous Results/Comments   N/A BMI Checklist  BMI > 40 or < 18 Added to chart:   No   N/A >= 45 Checklist  Added to chart:   No   **Does not need to be completed prior to placing on IVF calendar**    MD Completion:  Ectopic Risk: No  Medically Complex: yes    BOARDING PASS REVIEW NOTE  Reviewed boarding pass with the nurse and patient is able to move forward with the planned treatment cycle. Will order Antigen screening pattern for E antigen as recommended  by [facility] prior to FET cycle.  Nurse: JOSIANE Moraes 04/14/25 4:02 PM      BOARDING PASS REVIEW NOTE  Reviewed boarding pass with the nurse and patient is able to move forward with the planned treatment cycle.   Nurse: JOSIANE Moraes 04/29/25 4:23 PM

## 2024-10-04 NOTE — TELEPHONE ENCOUNTER
Returned patient call regarding FET plan. Patient instructed this RN sent staff message to Dr. Messina to confirm if patient is able to transfer 2 embryos. Patient instructed she can complete IVF consent and [employer] form now but must wait to fill out FET packet. Patient instructed she needs to go to a  lab for chlamydia gonorrhea testing. Patient states she is unsure of cycle length and does not know when to expect next menses. Patient states she has to have a scope and would like to do this prior to transfer. Patient does not yet know when scope will be. Patient educated on lab closure dates. Patient instructed to call with menses/when she knows scope date to determine timing of transfer. Monitoring and medications ordered at this time. Patient instructed to complete BP items in the meantime. Patient agreeable and denies further questions/concerns.   MINI GARCIA on 10/4/24 at 4:39 PM.

## 2024-10-05 LAB
HIV 1+2 AB+HIV1 P24 AG SERPL QL IA: NONREACTIVE
TREPONEMA PALLIDUM IGG+IGM AB [PRESENCE] IN SERUM OR PLASMA BY IMMUNOASSAY: NONREACTIVE

## 2024-10-06 DIAGNOSIS — Z01.83 BLOOD TYPING ENCOUNTER: Primary | ICD-10-CM

## 2024-10-07 ENCOUNTER — LAB (OUTPATIENT)
Dept: LAB | Facility: LAB | Age: 29
End: 2024-10-07
Payer: COMMERCIAL

## 2024-10-07 ENCOUNTER — TELEPHONE (OUTPATIENT)
Dept: ENDOCRINOLOGY | Facility: CLINIC | Age: 29
End: 2024-10-07

## 2024-10-07 DIAGNOSIS — Z11.3 SCREENING FOR STDS (SEXUALLY TRANSMITTED DISEASES): ICD-10-CM

## 2024-10-07 DIAGNOSIS — Z01.83 BLOOD TYPING ENCOUNTER: ICD-10-CM

## 2024-10-07 NOTE — TELEPHONE ENCOUNTER
Reason for call: Order Request  Notes: Pt stated she was unable to have her urine test done at local lab as it is clinic collect only. Can someone change the order and MyChart the patient to confirm the change has been made?

## 2024-10-07 NOTE — PROGRESS NOTES
Message to patient regarding positive antibody screen:    Hi Vonda, I hope you had a good weekend! My name is Cari & I am one of the Nps at the fertility center: I reviewed a recent blood type test you had performed, your blood type is O+ and you also have a positive antibody screen- this could be nothing at all and is a false positive results, or it could be from a past blood transfusion, from an earlier pregnancy, or even from exposure to some viruses or bacteria. Typically the antibody screen is negative. I placed an order for the antibody identification test- you can go to any outpatient  Lab, there will be instructions for the lab on what tubes to draw and how many they need. We will [place] back with you once the antibody identification is complete with any next steps if needed. Please reach back with any questions. I am out of the office until 10-21, but please reach back to Jerica if you need, Cari :)    Cari Damon CNP 10/06/24 10:23 PM

## 2024-10-07 NOTE — TELEPHONE ENCOUNTER
----- Message from Gloria Messina sent at 10/7/2024  3:46 PM EDT -----  Yes she has transferred 2 previously so I am okay with that as long as she has been counseled regarding risks  ----- Message -----  From: Mini Flores RN  Sent: 10/4/2024   4:02 PM EDT  To: Gloria Messina MD    Hi Dr. Messina,     Would you be able to review the recent consult note for this patient from Jerica Blanco? She noted:     Will also need to verify with Dr. Messina ok to transfer 2 embryos.     I just want to make sure I tell the patient how to fill out her FET form correctly!   Thank you,   MINI FLORES on 10/4/24 at 4:02 PM.

## 2024-10-09 ENCOUNTER — APPOINTMENT (OUTPATIENT)
Dept: CARDIOLOGY | Facility: HOSPITAL | Age: 29
End: 2024-10-09
Payer: COMMERCIAL

## 2024-10-09 ENCOUNTER — HOSPITAL ENCOUNTER (OUTPATIENT)
Facility: HOSPITAL | Age: 29
Setting detail: OBSERVATION
Discharge: HOME | End: 2024-10-10
Attending: EMERGENCY MEDICINE | Admitting: STUDENT IN AN ORGANIZED HEALTH CARE EDUCATION/TRAINING PROGRAM
Payer: COMMERCIAL

## 2024-10-09 ENCOUNTER — APPOINTMENT (OUTPATIENT)
Dept: RADIOLOGY | Facility: HOSPITAL | Age: 29
End: 2024-10-09
Payer: COMMERCIAL

## 2024-10-09 DIAGNOSIS — J69.0 ASPIRATION PNEUMONIA, UNSPECIFIED ASPIRATION PNEUMONIA TYPE, UNSPECIFIED LATERALITY, UNSPECIFIED PART OF LUNG (MULTI): ICD-10-CM

## 2024-10-09 DIAGNOSIS — J69.0 ASPIRATION PNEUMONIA OF RIGHT LOWER LOBE DUE TO GASTRIC SECRETIONS (MULTI): ICD-10-CM

## 2024-10-09 DIAGNOSIS — J96.01 ACUTE HYPOXIC RESPIRATORY FAILURE (MULTI): Primary | ICD-10-CM

## 2024-10-09 DIAGNOSIS — R00.0 SINUS TACHYCARDIA: ICD-10-CM

## 2024-10-09 PROBLEM — R13.10 ODYNOPHAGIA: Status: ACTIVE | Noted: 2024-10-09

## 2024-10-09 LAB
ABO GROUP (TYPE) IN BLOOD: NORMAL
ANION GAP SERPL CALCULATED.3IONS-SCNC: 13 MMOL/L (ref 10–20)
ANTIBODY SCREEN: NORMAL
BASOPHILS # BLD AUTO: 0.04 X10*3/UL (ref 0–0.1)
BASOPHILS NFR BLD AUTO: 0.8 %
BB ANTIBODY IDENTIFICATION: NORMAL
BUN SERPL-MCNC: 12 MG/DL (ref 6–23)
CALCIUM SERPL-MCNC: 8.6 MG/DL (ref 8.6–10.3)
CASE #: NORMAL
CHLORIDE SERPL-SCNC: 106 MMOL/L (ref 98–107)
CO2 SERPL-SCNC: 24 MMOL/L (ref 21–32)
CREAT SERPL-MCNC: 0.58 MG/DL (ref 0.5–1.05)
EGFRCR SERPLBLD CKD-EPI 2021: >90 ML/MIN/1.73M*2
EOSINOPHIL # BLD AUTO: 0.2 X10*3/UL (ref 0–0.7)
EOSINOPHIL NFR BLD AUTO: 3.8 %
ERYTHROCYTE [DISTWIDTH] IN BLOOD BY AUTOMATED COUNT: 16.5 % (ref 11.5–14.5)
GLUCOSE SERPL-MCNC: 113 MG/DL (ref 74–99)
HCT VFR BLD AUTO: 32.7 % (ref 36–46)
HGB BLD-MCNC: 9 G/DL (ref 12–16)
IMM GRANULOCYTES # BLD AUTO: 0.03 X10*3/UL (ref 0–0.7)
IMM GRANULOCYTES NFR BLD AUTO: 0.6 % (ref 0–0.9)
LYMPHOCYTES # BLD AUTO: 1.26 X10*3/UL (ref 1.2–4.8)
LYMPHOCYTES NFR BLD AUTO: 23.7 %
MCH RBC QN AUTO: 18.4 PG (ref 26–34)
MCHC RBC AUTO-ENTMCNC: 27.5 G/DL (ref 32–36)
MCV RBC AUTO: 67 FL (ref 80–100)
MONOCYTES # BLD AUTO: 0.34 X10*3/UL (ref 0.1–1)
MONOCYTES NFR BLD AUTO: 6.4 %
NEUTROPHILS # BLD AUTO: 3.44 X10*3/UL (ref 1.2–7.7)
NEUTROPHILS NFR BLD AUTO: 64.7 %
NRBC BLD-RTO: 0 /100 WBCS (ref 0–0)
PLATELET # BLD AUTO: 229 X10*3/UL (ref 150–450)
POTASSIUM SERPL-SCNC: 4.1 MMOL/L (ref 3.5–5.3)
RBC # BLD AUTO: 4.89 X10*6/UL (ref 4–5.2)
RH FACTOR (ANTIGEN D): NORMAL
SODIUM SERPL-SCNC: 139 MMOL/L (ref 136–145)
WBC # BLD AUTO: 5.3 X10*3/UL (ref 4.4–11.3)

## 2024-10-09 PROCEDURE — 82565 ASSAY OF CREATININE: CPT | Performed by: EMERGENCY MEDICINE

## 2024-10-09 PROCEDURE — 85025 COMPLETE CBC W/AUTO DIFF WBC: CPT | Performed by: EMERGENCY MEDICINE

## 2024-10-09 PROCEDURE — 93005 ELECTROCARDIOGRAM TRACING: CPT

## 2024-10-09 PROCEDURE — G0378 HOSPITAL OBSERVATION PER HR: HCPCS

## 2024-10-09 PROCEDURE — 96375 TX/PRO/DX INJ NEW DRUG ADDON: CPT

## 2024-10-09 PROCEDURE — 2500000004 HC RX 250 GENERAL PHARMACY W/ HCPCS (ALT 636 FOR OP/ED)

## 2024-10-09 PROCEDURE — 99291 CRITICAL CARE FIRST HOUR: CPT | Mod: 25

## 2024-10-09 PROCEDURE — 2500000002 HC RX 250 W HCPCS SELF ADMINISTERED DRUGS (ALT 637 FOR MEDICARE OP, ALT 636 FOR OP/ED): Performed by: EMERGENCY MEDICINE

## 2024-10-09 PROCEDURE — 94640 AIRWAY INHALATION TREATMENT: CPT

## 2024-10-09 PROCEDURE — 36415 COLL VENOUS BLD VENIPUNCTURE: CPT | Performed by: EMERGENCY MEDICINE

## 2024-10-09 PROCEDURE — 2500000005 HC RX 250 GENERAL PHARMACY W/O HCPCS: Performed by: EMERGENCY MEDICINE

## 2024-10-09 PROCEDURE — 2500000001 HC RX 250 WO HCPCS SELF ADMINISTERED DRUGS (ALT 637 FOR MEDICARE OP): Performed by: NURSE PRACTITIONER

## 2024-10-09 PROCEDURE — 80048 BASIC METABOLIC PNL TOTAL CA: CPT | Performed by: EMERGENCY MEDICINE

## 2024-10-09 PROCEDURE — 96367 TX/PROPH/DG ADDL SEQ IV INF: CPT

## 2024-10-09 PROCEDURE — 96361 HYDRATE IV INFUSION ADD-ON: CPT

## 2024-10-09 PROCEDURE — 71045 X-RAY EXAM CHEST 1 VIEW: CPT

## 2024-10-09 PROCEDURE — 96365 THER/PROPH/DIAG IV INF INIT: CPT

## 2024-10-09 PROCEDURE — 71045 X-RAY EXAM CHEST 1 VIEW: CPT | Performed by: RADIOLOGY

## 2024-10-09 PROCEDURE — 2500000004 HC RX 250 GENERAL PHARMACY W/ HCPCS (ALT 636 FOR OP/ED): Mod: JZ | Performed by: NURSE PRACTITIONER

## 2024-10-09 RX ORDER — FAMOTIDINE 20 MG/1
20 TABLET, FILM COATED ORAL 2 TIMES DAILY
Status: DISCONTINUED | OUTPATIENT
Start: 2024-10-09 | End: 2024-10-09

## 2024-10-09 RX ORDER — ACETAMINOPHEN 160 MG/5ML
650 LIQUID ORAL EVERY 4 HOURS PRN
Status: DISCONTINUED | OUTPATIENT
Start: 2024-10-09 | End: 2024-10-09

## 2024-10-09 RX ORDER — ESTRADIOL 1 MG/1
6 TABLET ORAL DAILY
Status: DISCONTINUED | OUTPATIENT
Start: 2024-10-09 | End: 2024-10-09

## 2024-10-09 RX ORDER — DOXYCYCLINE 100 MG/1
100 CAPSULE ORAL 2 TIMES DAILY
Qty: 14 CAPSULE | Refills: 0 | Status: SHIPPED | OUTPATIENT
Start: 2024-10-09 | End: 2024-10-10 | Stop reason: HOSPADM

## 2024-10-09 RX ORDER — IPRATROPIUM BROMIDE AND ALBUTEROL SULFATE 2.5; .5 MG/3ML; MG/3ML
3 SOLUTION RESPIRATORY (INHALATION) ONCE
Status: COMPLETED | OUTPATIENT
Start: 2024-10-09 | End: 2024-10-09

## 2024-10-09 RX ORDER — ALBUTEROL SULFATE 90 UG/1
2 INHALANT RESPIRATORY (INHALATION) EVERY 4 HOURS PRN
Qty: 18 G | Refills: 0 | Status: SHIPPED | OUTPATIENT
Start: 2024-10-09 | End: 2024-10-10 | Stop reason: HOSPADM

## 2024-10-09 RX ORDER — FAMOTIDINE 10 MG/ML
20 INJECTION INTRAVENOUS 2 TIMES DAILY
Status: DISCONTINUED | OUTPATIENT
Start: 2024-10-09 | End: 2024-10-10 | Stop reason: HOSPADM

## 2024-10-09 RX ORDER — GUAIFENESIN/DEXTROMETHORPHAN 100-10MG/5
5 SYRUP ORAL EVERY 4 HOURS PRN
Status: DISCONTINUED | OUTPATIENT
Start: 2024-10-09 | End: 2024-10-10 | Stop reason: HOSPADM

## 2024-10-09 RX ORDER — BISACODYL 10 MG/1
10 SUPPOSITORY RECTAL DAILY PRN
Status: DISCONTINUED | OUTPATIENT
Start: 2024-10-09 | End: 2024-10-10 | Stop reason: HOSPADM

## 2024-10-09 RX ORDER — ONDANSETRON HYDROCHLORIDE 2 MG/ML
4 INJECTION, SOLUTION INTRAVENOUS EVERY 8 HOURS PRN
Status: DISCONTINUED | OUTPATIENT
Start: 2024-10-09 | End: 2024-10-10 | Stop reason: HOSPADM

## 2024-10-09 RX ORDER — PROCHLORPERAZINE MALEATE 10 MG
10 TABLET ORAL EVERY 6 HOURS PRN
Status: DISCONTINUED | OUTPATIENT
Start: 2024-10-09 | End: 2024-10-10 | Stop reason: HOSPADM

## 2024-10-09 RX ORDER — BISACODYL 5 MG
10 TABLET, DELAYED RELEASE (ENTERIC COATED) ORAL DAILY PRN
Status: DISCONTINUED | OUTPATIENT
Start: 2024-10-09 | End: 2024-10-10 | Stop reason: HOSPADM

## 2024-10-09 RX ORDER — PROCHLORPERAZINE 25 MG/1
25 SUPPOSITORY RECTAL EVERY 12 HOURS PRN
Status: DISCONTINUED | OUTPATIENT
Start: 2024-10-09 | End: 2024-10-10 | Stop reason: HOSPADM

## 2024-10-09 RX ORDER — ACETAMINOPHEN 160 MG/5ML
650 SOLUTION ORAL EVERY 4 HOURS PRN
Status: DISCONTINUED | OUTPATIENT
Start: 2024-10-09 | End: 2024-10-10 | Stop reason: HOSPADM

## 2024-10-09 RX ORDER — ESTRADIOL 1 MG/1
2 TABLET ORAL 2 TIMES DAILY
Status: DISCONTINUED | OUTPATIENT
Start: 2024-10-09 | End: 2024-10-09

## 2024-10-09 RX ORDER — LIDOCAINE AND PRILOCAINE 25; 25 MG/G; MG/G
CREAM TOPICAL DAILY
Status: DISCONTINUED | OUTPATIENT
Start: 2024-10-09 | End: 2024-10-09

## 2024-10-09 RX ORDER — ONDANSETRON 4 MG/1
4 TABLET, ORALLY DISINTEGRATING ORAL EVERY 8 HOURS PRN
Status: DISCONTINUED | OUTPATIENT
Start: 2024-10-09 | End: 2024-10-10 | Stop reason: HOSPADM

## 2024-10-09 RX ORDER — LEVOTHYROXINE SODIUM 25 UG/1
25 TABLET ORAL DAILY
Status: DISCONTINUED | OUTPATIENT
Start: 2024-10-09 | End: 2024-10-09

## 2024-10-09 RX ORDER — PROCHLORPERAZINE EDISYLATE 5 MG/ML
10 INJECTION INTRAMUSCULAR; INTRAVENOUS EVERY 6 HOURS PRN
Status: DISCONTINUED | OUTPATIENT
Start: 2024-10-09 | End: 2024-10-10 | Stop reason: HOSPADM

## 2024-10-09 RX ORDER — POLYETHYLENE GLYCOL 3350 17 G/17G
17 POWDER, FOR SOLUTION ORAL AS NEEDED
Status: DISCONTINUED | OUTPATIENT
Start: 2024-10-09 | End: 2024-10-10 | Stop reason: HOSPADM

## 2024-10-09 RX ORDER — MEROPENEM AND SODIUM CHLORIDE 1 G/50ML
1 INJECTION, SOLUTION INTRAVENOUS EVERY 8 HOURS
Status: DISCONTINUED | OUTPATIENT
Start: 2024-10-09 | End: 2024-10-10 | Stop reason: HOSPADM

## 2024-10-09 RX ORDER — ACETAMINOPHEN 650 MG/1
650 SUPPOSITORY RECTAL EVERY 4 HOURS PRN
Status: DISCONTINUED | OUTPATIENT
Start: 2024-10-09 | End: 2024-10-10 | Stop reason: HOSPADM

## 2024-10-09 RX ORDER — ACETAMINOPHEN 325 MG/1
650 TABLET ORAL EVERY 4 HOURS PRN
Status: DISCONTINUED | OUTPATIENT
Start: 2024-10-09 | End: 2024-10-10 | Stop reason: HOSPADM

## 2024-10-09 RX ADMIN — SODIUM CHLORIDE 500 ML: 900 INJECTION, SOLUTION INTRAVENOUS at 15:56

## 2024-10-09 RX ADMIN — FAMOTIDINE 20 MG: 10 INJECTION, SOLUTION INTRAVENOUS at 20:39

## 2024-10-09 RX ADMIN — SODIUM CHLORIDE 500 ML: 900 INJECTION, SOLUTION INTRAVENOUS at 14:00

## 2024-10-09 RX ADMIN — IPRATROPIUM BROMIDE AND ALBUTEROL SULFATE 3 ML: 2.5; .5 SOLUTION RESPIRATORY (INHALATION) at 12:34

## 2024-10-09 RX ADMIN — MEROPENEM AND SODIUM CHLORIDE 1 G: 1 INJECTION, SOLUTION INTRAVENOUS at 20:17

## 2024-10-09 RX ADMIN — DOXYCYCLINE 100 MG: 100 INJECTION, POWDER, LYOPHILIZED, FOR SOLUTION INTRAVENOUS at 17:43

## 2024-10-09 RX ADMIN — ACETAMINOPHEN 650 MG: 160 SOLUTION ORAL at 20:38

## 2024-10-09 RX ADMIN — Medication 21 PERCENT: at 19:39

## 2024-10-09 RX ADMIN — Medication 4 L/MIN: at 12:34

## 2024-10-09 SDOH — SOCIAL STABILITY: SOCIAL INSECURITY: WITHIN THE LAST YEAR, HAVE YOU BEEN HUMILIATED OR EMOTIONALLY ABUSED IN OTHER WAYS BY YOUR PARTNER OR EX-PARTNER?: NO

## 2024-10-09 SDOH — SOCIAL STABILITY: SOCIAL INSECURITY: DO YOU FEEL UNSAFE GOING BACK TO THE PLACE WHERE YOU ARE LIVING?: NO

## 2024-10-09 SDOH — SOCIAL STABILITY: SOCIAL NETWORK
DO YOU BELONG TO ANY CLUBS OR ORGANIZATIONS SUCH AS CHURCH GROUPS, UNIONS, FRATERNAL OR ATHLETIC GROUPS, OR SCHOOL GROUPS?: NO

## 2024-10-09 SDOH — HEALTH STABILITY: PHYSICAL HEALTH
HOW OFTEN DO YOU NEED TO HAVE SOMEONE HELP YOU WHEN YOU READ INSTRUCTIONS, PAMPHLETS, OR OTHER WRITTEN MATERIAL FROM YOUR DOCTOR OR PHARMACY?: NEVER

## 2024-10-09 SDOH — HEALTH STABILITY: MENTAL HEALTH
DO YOU FEEL STRESS - TENSE, RESTLESS, NERVOUS, OR ANXIOUS, OR UNABLE TO SLEEP AT NIGHT BECAUSE YOUR MIND IS TROUBLED ALL THE TIME - THESE DAYS?: NOT AT ALL

## 2024-10-09 SDOH — SOCIAL STABILITY: SOCIAL INSECURITY
WITHIN THE LAST YEAR, HAVE TO BEEN RAPED OR FORCED TO HAVE ANY KIND OF SEXUAL ACTIVITY BY YOUR PARTNER OR EX-PARTNER?: NO

## 2024-10-09 SDOH — SOCIAL STABILITY: SOCIAL INSECURITY: ARE YOU OR HAVE YOU BEEN THREATENED OR ABUSED PHYSICALLY, EMOTIONALLY, OR SEXUALLY BY ANYONE?: NO

## 2024-10-09 SDOH — SOCIAL STABILITY: SOCIAL INSECURITY
WITHIN THE LAST YEAR, HAVE YOU BEEN KICKED, HIT, SLAPPED, OR OTHERWISE PHYSICALLY HURT BY YOUR PARTNER OR EX-PARTNER?: NO

## 2024-10-09 SDOH — ECONOMIC STABILITY: FOOD INSECURITY: WITHIN THE PAST 12 MONTHS, YOU WORRIED THAT YOUR FOOD WOULD RUN OUT BEFORE YOU GOT THE MONEY TO BUY MORE.: NEVER TRUE

## 2024-10-09 SDOH — HEALTH STABILITY: MENTAL HEALTH
STRESS IS WHEN SOMEONE FEELS TENSE, NERVOUS, ANXIOUS, OR CAN'T SLEEP AT NIGHT BECAUSE THEIR MIND IS TROUBLED. HOW STRESSED ARE YOU?: NOT AT ALL

## 2024-10-09 SDOH — HEALTH STABILITY: PHYSICAL HEALTH: ON AVERAGE, HOW MANY DAYS PER WEEK DO YOU ENGAGE IN MODERATE TO STRENUOUS EXERCISE (LIKE A BRISK WALK)?: 0 DAYS

## 2024-10-09 SDOH — ECONOMIC STABILITY: TRANSPORTATION INSECURITY: IN THE PAST 12 MONTHS, HAS LACK OF TRANSPORTATION KEPT YOU FROM MEDICAL APPOINTMENTS OR FROM GETTING MEDICATIONS?: NO

## 2024-10-09 SDOH — SOCIAL STABILITY: SOCIAL NETWORK: ARE YOU MARRIED, WIDOWED, DIVORCED, SEPARATED, NEVER MARRIED, OR LIVING WITH A PARTNER?: MARRIED

## 2024-10-09 SDOH — HEALTH STABILITY: PHYSICAL HEALTH: ON AVERAGE, HOW MANY MINUTES DO YOU ENGAGE IN EXERCISE AT THIS LEVEL?: 0 MIN

## 2024-10-09 SDOH — ECONOMIC STABILITY: FOOD INSECURITY: WITHIN THE PAST 12 MONTHS, THE FOOD YOU BOUGHT JUST DIDN'T LAST AND YOU DIDN'T HAVE MONEY TO GET MORE.: NEVER TRUE

## 2024-10-09 SDOH — SOCIAL STABILITY: SOCIAL INSECURITY: HAS ANYONE EVER THREATENED TO HURT YOUR FAMILY OR YOUR PETS?: NO

## 2024-10-09 SDOH — SOCIAL STABILITY: SOCIAL INSECURITY: WITHIN THE LAST YEAR, HAVE YOU BEEN AFRAID OF YOUR PARTNER OR EX-PARTNER?: NO

## 2024-10-09 SDOH — SOCIAL STABILITY: SOCIAL INSECURITY: ARE THERE ANY APPARENT SIGNS OF INJURIES/BEHAVIORS THAT COULD BE RELATED TO ABUSE/NEGLECT?: NO

## 2024-10-09 SDOH — ECONOMIC STABILITY: FOOD INSECURITY: WITHIN THE PAST 12 MONTHS, YOU WORRIED THAT YOUR FOOD WOULD RUN OUT BEFORE YOU GOT MONEY TO BUY MORE.: NEVER TRUE

## 2024-10-09 SDOH — SOCIAL STABILITY: SOCIAL INSECURITY: ARE YOU MARRIED, WIDOWED, DIVORCED, SEPARATED, NEVER MARRIED, OR LIVING WITH A PARTNER?: MARRIED

## 2024-10-09 SDOH — ECONOMIC STABILITY: TRANSPORTATION INSECURITY
IN THE PAST 12 MONTHS, HAS LACK OF TRANSPORTATION KEPT YOU FROM MEETINGS, WORK, OR FROM GETTING THINGS NEEDED FOR DAILY LIVING?: NO

## 2024-10-09 SDOH — SOCIAL STABILITY: SOCIAL NETWORK: IN A TYPICAL WEEK, HOW MANY TIMES DO YOU TALK ON THE PHONE WITH FAMILY, FRIENDS, OR NEIGHBORS?: THREE TIMES A WEEK

## 2024-10-09 SDOH — ECONOMIC STABILITY: INCOME INSECURITY: HOW HARD IS IT FOR YOU TO PAY FOR THE VERY BASICS LIKE FOOD, HOUSING, MEDICAL CARE, AND HEATING?: NOT HARD AT ALL

## 2024-10-09 SDOH — SOCIAL STABILITY: SOCIAL INSECURITY: HAVE YOU HAD ANY THOUGHTS OF HARMING ANYONE ELSE?: NO

## 2024-10-09 SDOH — SOCIAL STABILITY: SOCIAL INSECURITY
WITHIN THE LAST YEAR, HAVE YOU BEEN RAPED OR FORCED TO HAVE ANY KIND OF SEXUAL ACTIVITY BY YOUR PARTNER OR EX-PARTNER?: NO

## 2024-10-09 SDOH — SOCIAL STABILITY: SOCIAL INSECURITY: DO YOU FEEL ANYONE HAS EXPLOITED OR TAKEN ADVANTAGE OF YOU FINANCIALLY OR OF YOUR PERSONAL PROPERTY?: NO

## 2024-10-09 SDOH — SOCIAL STABILITY: SOCIAL NETWORK
DO YOU BELONG TO ANY CLUBS OR ORGANIZATIONS SUCH AS CHURCH GROUPS UNIONS, FRATERNAL OR ATHLETIC GROUPS, OR SCHOOL GROUPS?: NO

## 2024-10-09 SDOH — SOCIAL STABILITY: SOCIAL NETWORK: HOW OFTEN DO YOU ATTEND CHURCH OR RELIGIOUS SERVICES?: MORE THAN 4 TIMES PER YEAR

## 2024-10-09 SDOH — SOCIAL STABILITY: SOCIAL NETWORK: HOW OFTEN DO YOU ATTEND MEETINGS OF THE CLUBS OR ORGANIZATIONS YOU BELONG TO?: NEVER

## 2024-10-09 SDOH — SOCIAL STABILITY: SOCIAL INSECURITY: DOES ANYONE TRY TO KEEP YOU FROM HAVING/CONTACTING OTHER FRIENDS OR DOING THINGS OUTSIDE YOUR HOME?: NO

## 2024-10-09 SDOH — SOCIAL STABILITY: SOCIAL INSECURITY: HAVE YOU HAD THOUGHTS OF HARMING ANYONE ELSE?: NO

## 2024-10-09 SDOH — SOCIAL STABILITY: SOCIAL NETWORK: HOW OFTEN DO YOU ATTENT MEETINGS OF THE CLUB OR ORGANIZATION YOU BELONG TO?: NEVER

## 2024-10-09 SDOH — SOCIAL STABILITY: SOCIAL INSECURITY: ABUSE: ADULT

## 2024-10-09 SDOH — ECONOMIC STABILITY: FOOD INSECURITY: HOW HARD IS IT FOR YOU TO PAY FOR THE VERY BASICS LIKE FOOD, HOUSING, MEDICAL CARE, AND HEATING?: NOT HARD AT ALL

## 2024-10-09 SDOH — ECONOMIC STABILITY: TRANSPORTATION INSECURITY
IN THE PAST 12 MONTHS, HAS THE LACK OF TRANSPORTATION KEPT YOU FROM MEDICAL APPOINTMENTS OR FROM GETTING MEDICATIONS?: NO

## 2024-10-09 ASSESSMENT — LIFESTYLE VARIABLES
SUBSTANCE_ABUSE_PAST_12_MONTHS: NO
HOW OFTEN DO YOU HAVE A DRINK CONTAINING ALCOHOL: NEVER
PRESCIPTION_ABUSE_PAST_12_MONTHS: NO
HOW MANY STANDARD DRINKS CONTAINING ALCOHOL DO YOU HAVE ON A TYPICAL DAY: PATIENT DOES NOT DRINK
HOW OFTEN DO YOU HAVE 6 OR MORE DRINKS ON ONE OCCASION: NEVER
AUDIT-C TOTAL SCORE: 0
SKIP TO QUESTIONS 9-10: 1
AUDIT-C TOTAL SCORE: 0

## 2024-10-09 ASSESSMENT — COGNITIVE AND FUNCTIONAL STATUS - GENERAL
DAILY ACTIVITIY SCORE: 24
PATIENT BASELINE BEDBOUND: NO
MOBILITY SCORE: 24

## 2024-10-09 ASSESSMENT — ENCOUNTER SYMPTOMS
ALLERGIC/IMMUNOLOGIC NEGATIVE: 1
ABDOMINAL PAIN: 0
PALPITATIONS: 0
FATIGUE: 0
CHEST TIGHTNESS: 0
RHINORRHEA: 0
UNEXPECTED WEIGHT CHANGE: 0
BLOOD IN STOOL: 0
HEMATOLOGIC/LYMPHATIC NEGATIVE: 1
EYES NEGATIVE: 1
NUMBNESS: 0
SPEECH DIFFICULTY: 0
ACTIVITY CHANGE: 0
ABDOMINAL DISTENTION: 0
WEAKNESS: 0
HEMATURIA: 0
VOICE CHANGE: 0
WHEEZING: 0
STRIDOR: 0
CONSTITUTIONAL NEGATIVE: 1
FEVER: 0
CHOKING: 0
LIGHT-HEADEDNESS: 0
DIFFICULTY URINATING: 0
COUGH: 1
DIARRHEA: 0
SHORTNESS OF BREATH: 1
SEIZURES: 0
SORE THROAT: 0
VOMITING: 0
TROUBLE SWALLOWING: 1
MUSCULOSKELETAL NEGATIVE: 1
CHILLS: 0
HEADACHES: 0
NAUSEA: 0
ENDOCRINE NEGATIVE: 1
TREMORS: 0
PSYCHIATRIC NEGATIVE: 1
DIZZINESS: 0
DYSURIA: 0
APNEA: 0
APPETITE CHANGE: 0
CONSTIPATION: 0
SINUS PRESSURE: 0

## 2024-10-09 ASSESSMENT — ACTIVITIES OF DAILY LIVING (ADL)
GROOMING: INDEPENDENT
HEARING - RIGHT EAR: FUNCTIONAL
ADEQUATE_TO_COMPLETE_ADL: YES
PATIENT'S MEMORY ADEQUATE TO SAFELY COMPLETE DAILY ACTIVITIES?: YES
FEEDING YOURSELF: INDEPENDENT
DRESSING YOURSELF: INDEPENDENT
LACK_OF_TRANSPORTATION: NO
LACK_OF_TRANSPORTATION: NO
WALKS IN HOME: INDEPENDENT
TOILETING: INDEPENDENT
HEARING - LEFT EAR: FUNCTIONAL
BATHING: INDEPENDENT
JUDGMENT_ADEQUATE_SAFELY_COMPLETE_DAILY_ACTIVITIES: YES

## 2024-10-09 ASSESSMENT — COLUMBIA-SUICIDE SEVERITY RATING SCALE - C-SSRS
6. HAVE YOU EVER DONE ANYTHING, STARTED TO DO ANYTHING, OR PREPARED TO DO ANYTHING TO END YOUR LIFE?: NO
1. IN THE PAST MONTH, HAVE YOU WISHED YOU WERE DEAD OR WISHED YOU COULD GO TO SLEEP AND NOT WAKE UP?: NO
2. HAVE YOU ACTUALLY HAD ANY THOUGHTS OF KILLING YOURSELF?: NO

## 2024-10-09 ASSESSMENT — PAIN SCALES - GENERAL
PAINLEVEL_OUTOF10: 4
PAINLEVEL_OUTOF10: 2
PAINLEVEL_OUTOF10: 0 - NO PAIN
PAINLEVEL_OUTOF10: 4

## 2024-10-09 ASSESSMENT — PAIN DESCRIPTION - PROGRESSION: CLINICAL_PROGRESSION: NOT CHANGED

## 2024-10-09 ASSESSMENT — PAIN - FUNCTIONAL ASSESSMENT
PAIN_FUNCTIONAL_ASSESSMENT: 0-10

## 2024-10-09 ASSESSMENT — PAIN DESCRIPTION - LOCATION: LOCATION: HEAD

## 2024-10-09 NOTE — Clinical Note
Vonda Francis was seen and treated in our emergency department on 10/9/2024.  She may return to work on 10/11/2024.       If you have any questions or concerns, please don't hesitate to call.      Tye Tobar PA-C

## 2024-10-09 NOTE — ED PROVIDER NOTES
The patient was seen in conjunction with the advanced practice provider, and I performed a substantive portion of the visit. I personally saw the patient and made/approved the management plan and take responsibility for the patient management. All medical decision making was performed by me. All laboratory studies, radiology, and EKGs were reviewed by me.     History: 29-year-old female presents for audible aspiration during EGD.  She was undergoing an EGD with Dr. Peck as an outpatient for eosinophilic esophagitis and odynophagia, n.p.o. since last night but he called to provide report noted a stricture during the EGD was attempting to dilate when it appeared that patient aspirated became hypoxic.  They did bag her give IV Solu-Medrol and DuoNeb and she improved but did become hypoxic requiring oxygen so EMS was called and patient sent here for assessment.  She does endorse coughing and feeling mildly short of breath hypoxic to 86% on room air on arrival placed on nasal cannula oxygen.  Physical exam:  Constitutional:  Awake, alert, well appearing, nontoxic, bronchospastic cough noted  HEENT:  Normocephalic, atraumatic  Neck: Trachea midline, no stridor  Respiratory/Chest: No respiratory distress but bronchospastic cough noted, somewhat diminished air entry at bilateral bases otherwise clear bilaterally  CV:  R tachycardic egular rate and regular rhythm, no murmurs, gallops, or rubs  Neuro: A/O, normal speech  Skin:  Warm and dry    MDM: 29-year-old female presents for hypoxia and shortness of breath following likely aspiration event during EGD and attempted esophageal dilation.  She is tachycardic, tachypneic and hypoxic to 86% on room air placed on 4 L nasal cannula oxygen.  Some diminished breath sounds with bronchospastic cough noted.  Will observe on oxygen and check chest x-ray for possible aspiration pneumonia, give DuoNeb.  Labs obtained without significant abnormality.  Chest x-ray on my independent  interpretation with some mild possible atelectasis versus infiltrate in the left lower lung.  Patient feeling improved after DuoNeb was able to be weaned from oxygen room air but remains tachycardic, started on IV antibiotics and will require admission for further management.    CRITICAL CARE NOTE:    Upon my evaluation, this patient had a high probability of imminent or life-threatening deterioration due to hypoxic respiratory failure, which required my direct attention, intervention, and personal management    31 total minutes of critical care were personally provided which excludes and was non concurrent with other providers and all other billable procedures.  This was for time at the bedside, re-evaluations, interpretation of lab and imaging results, discussions with consultants, and monitoring for potential decompensation.  Intervention were performed as documented above.    ED Course as of 10/10/24 0831   Wed Oct 09, 2024   1325 Patient was on 4L, titrated down to 3L and saturating appropriately. [AR]   1336 Patient holding steady on 3 L nasal cannula at 98%.  Patient has been titrated down to 2 L nasal cannula. [AR]   1350 Patient's blood pressures becoming soft, 500 cc fluids ordered. [AR]   1355 Patient was holding steady at 98% on 2 L nasal cannula.  1 L nasal cannula applied. [AR]   1410 Patient is now at 97% on 1 L.  Patient has been titrated to room air. [AR]   1422 Patient currently on room air saturating 94 to 95%.  Did discuss ablation she would have to successfully p.o. a trial of ambulation prior to discharge. [AR]      ED Course User Index  [AR] Tye Tobar PA-C         Diagnoses as of 10/10/24 0831   Acute hypoxic respiratory failure (Multi)   Aspiration pneumonia of right lower lobe due to gastric secretions (Multi)   Sinus tachycardia              Nena Fraga MD  10/10/24 0831

## 2024-10-09 NOTE — H&P
History Of Present Illness  Vonda Francis is a 29 y.o. female   This is a 29 years old female with history of GERD, who presented from EGD office to ED with concern of aspiration hypoxia and tachycardia.  Reported that GI was dilating the esophagus and believes that patient has aspirated.  Patient said that she had EGD due to swallowing issues.  She said that she was having pain with swallowing, sometimes food gets stuck in her throat .her symptoms started for almost 1 month.  She was able to eat  half of a sandwich at the ED , she had to eat small bites and give time to the food to go down.  Admitted that she was having some cough once a while .Denies any cold symptoms.  She denies any issues to drink water or liquids.  She denies any chest pain or shortness of breath in room air.  She denies any nausea vomiting or abdominal pain no constipation or diarrhea.  No neurological deficit.     At the ED chest x-ray shows aspiration pneumonia patient got DuoNeb CBC BMP unremarkable.  1 L bolus of normal saline with also received IV doxycycline on telemetry patient was tachycardiac .  labs unremarkable      Past Medical History  Past Medical History:   Diagnosis Date    Acid reflux     Encounter for gynecological examination (general) (routine) without abnormal findings 08/01/2016    Well woman exam with routine gynecological exam    Encounter for screening for infections with a predominantly sexual mode of transmission 08/01/2016    Screen for sexually transmitted diseases    Female infertility     Gestational diabetes     Gestational hypertension ([facility])     Other nail disorders 08/01/2016    Nail deformity    Personal history of diseases of the skin and subcutaneous tissue 07/16/2015    History of paronychia of finger    Personal history of diseases of the skin and subcutaneous tissue 08/01/2016    History of folliculitis    Personal history of other infectious and parasitic diseases 09/12/2016    History of  chlamydia infection    Personal history of other infectious and parasitic diseases 09/12/2016    History of chlamydia infection    Plantar wart 09/13/2016    Bilateral plantar wart    Plantar wart 08/01/2016    Plantar wart, left foot       Surgical History  Past Surgical History:   Procedure Laterality Date    SALPINGECTOMY Bilateral         Social History  She reports that she has quit smoking. Her smoking use included cigarettes. She has been exposed to tobacco smoke. She has never used smokeless tobacco. She reports that she does not currently use alcohol. She reports that she does not use drugs.    Family History  Family History   Problem Relation Name Age of Onset    Bipolar disorder Mother      Other (CERVICAL CARCINOMA) Mother      Depression Mother      Asthma Mother      Diabetes Mother      Hypertension Mother      Hyperlipidemia Mother      Asthma Father      Depression Sister      Asthma Sister      Diabetes Maternal Grandfather      Lung cancer Maternal Grandfather      Bone cancer Maternal Grandfather          Allergies  Penicillins    Review of Systems   Constitutional: Negative.  Negative for activity change, appetite change, chills, fatigue, fever and unexpected weight change.   HENT:  Positive for trouble swallowing. Negative for congestion, rhinorrhea, sinus pressure, sneezing, sore throat, tinnitus and voice change.    Eyes: Negative.    Respiratory:  Positive for cough and shortness of breath. Negative for apnea, choking, chest tightness, wheezing and stridor.    Cardiovascular:  Negative for chest pain, palpitations and leg swelling.   Gastrointestinal:  Negative for abdominal distention, abdominal pain, blood in stool, constipation, diarrhea, nausea and vomiting.   Endocrine: Negative.    Genitourinary:  Negative for decreased urine volume, difficulty urinating, dysuria and hematuria.   Musculoskeletal: Negative.    Skin: Negative.    Allergic/Immunologic: Negative.    Neurological:  Negative  "for dizziness, tremors, seizures, syncope, speech difficulty, weakness, light-headedness, numbness and headaches.   Hematological: Negative.    Psychiatric/Behavioral: Negative.          Physical Exam  Vitals and nursing note reviewed.   Constitutional:       Appearance: Normal appearance.   HENT:      Head: Normocephalic and atraumatic.      Nose: Nose normal. No congestion or rhinorrhea.   Eyes:      Extraocular Movements: Extraocular movements intact.      Pupils: Pupils are equal, round, and reactive to light.   Cardiovascular:      Rate and Rhythm: Regular rhythm. Tachycardia present.      Pulses: Normal pulses.   Pulmonary:      Effort: Pulmonary effort is normal.      Breath sounds: Normal breath sounds.   Abdominal:      General: Bowel sounds are normal. There is no distension.      Palpations: Abdomen is soft.      Tenderness: There is no right CVA tenderness or left CVA tenderness.   Musculoskeletal:         General: Normal range of motion.      Cervical back: Normal range of motion and neck supple.      Right lower leg: No edema.      Left lower leg: No edema.   Skin:     General: Skin is warm.   Neurological:      General: No focal deficit present.      Mental Status: She is alert and oriented to person, place, and time.   Psychiatric:         Mood and Affect: Mood normal.          Last Recorded Vitals  Blood pressure 112/65, pulse (!) 127, temperature 37.5 °C (99.5 °F), resp. rate (!) 25, height 1.6 m (5' 3\"), weight 84 kg (185 lb 3 oz), last menstrual period 09/15/2024, SpO2 96%, unknown if currently breastfeeding.    Relevant Results             Assessment/Plan   Assessment & Plan  Acute hypoxic respiratory failure (Multi)  Patient is in room air  GERD (gastroesophageal reflux disease)  Nausea or vomiting nausea vomiting  Hypothyroidism  Continue with Synthroid  Iron deficiency anemia  H&H and low MCV  Order TIBC and ferritin  Monitor for any bleeding  Tachycardia  May be related to DuoNeb and " propofol   At 1 L of IV fluid  On telemetry  Odynophagia  Patient have swallow issues  Consult speech therapy for swallow eval  Consults GI DrSree Ziegler  Aspiration pneumonia (Multi)  Fever white blood cells normal  Patient is allergic to penicillin started on meropenem  Follow-up with the sputum culture          I spent 55 minutes in the professional and overall care of this patient.      Tracey Nash, APRN-CNP

## 2024-10-09 NOTE — ED PROVIDER NOTES
HPI   Chief Complaint   Patient presents with    Aspiration       Patient is a 29-year-old female presenting from the EGD office via EMS with concerns for aspiration.  Report is that the gastroenterologist was dilating the esophagus and at that time believe the patient has aspirated.  Patient was under sedation of propofol and was not oxygenating appropriately.  Per EMS the patient on 10 L and 90%.  Reportedly she was in the high 70s to low 80s percent on arrival.  Patient does not wear oxygen at home.  Patient is A and O x 4 but is having intermittent nonproductive cough likely related to the procedure.  Patient been placed on oxygen here.  Attending physician spoke with gastroenterologist, Dr. Peck, prior to patient arrival.  40 mg Solu-Medrol and 1 albuterol treatment given via EMS prior to arrival.              Patient History   Past Medical History:   Diagnosis Date    Acid reflux     Encounter for gynecological examination (general) (routine) without abnormal findings 08/01/2016    Well woman exam with routine gynecological exam    Encounter for screening for infections with a predominantly sexual mode of transmission 08/01/2016    Screen for sexually transmitted diseases    Female infertility     Gestational diabetes     Gestational hypertension ([facility])     Other nail disorders 08/01/2016    Nail deformity    Personal history of diseases of the skin and subcutaneous tissue 07/16/2015    History of paronychia of finger    Personal history of diseases of the skin and subcutaneous tissue 08/01/2016    History of folliculitis    Personal history of other infectious and parasitic diseases 09/12/2016    History of chlamydia infection    Personal history of other infectious and parasitic diseases 09/12/2016    History of chlamydia infection    Plantar wart 09/13/2016    Bilateral plantar wart    Plantar wart 08/01/2016    Plantar wart, left foot     Past Surgical History:   Procedure Laterality Date     SALPINGECTOMY Bilateral      Family History   Problem Relation Name Age of Onset    Bipolar disorder Mother      Other (CERVICAL CARCINOMA) Mother      Depression Mother      Asthma Mother      Diabetes Mother      Hypertension Mother      Hyperlipidemia Mother      Asthma Father      Depression Sister      Asthma Sister      Diabetes Maternal Grandfather      Lung cancer Maternal Grandfather      Bone cancer Maternal Grandfather       Social History     Tobacco Use    Smoking status: Former     Types: Cigarettes     Passive exposure: Past    Smokeless tobacco: Never   Vaping Use    Vaping status: Never Used   Substance Use Topics    Alcohol use: Not Currently    Drug use: Never       Physical Exam   ED Triage Vitals [10/09/24 1218]   Temperature Heart Rate Respirations BP   36.5 °C (97.7 °F) (!) 109 (!) 24 117/73      Pulse Ox Temp src Heart Rate Source Patient Position   96 % -- Monitor --      BP Location FiO2 (%)     -- --       Physical Exam  Vitals and nursing note reviewed.   Constitutional:       Appearance: She is well-developed.      Comments: Awake, laying in examination bed   HENT:      Head: Normocephalic and atraumatic.      Nose: Nose normal.      Mouth/Throat:      Mouth: Mucous membranes are moist.      Pharynx: Oropharynx is clear.   Eyes:      Extraocular Movements: Extraocular movements intact.      Conjunctiva/sclera: Conjunctivae normal.      Pupils: Pupils are equal, round, and reactive to light.   Cardiovascular:      Rate and Rhythm: Regular rhythm. Tachycardia present.      Pulses: Normal pulses.      Heart sounds: Normal heart sounds. No murmur heard.  Pulmonary:      Effort: Pulmonary effort is normal. No respiratory distress.      Breath sounds: Normal breath sounds.   Abdominal:      General: Abdomen is flat.      Palpations: Abdomen is soft.      Tenderness: There is no abdominal tenderness.   Musculoskeletal:         General: No swelling. Normal range of motion.      Cervical back:  Normal range of motion and neck supple.   Skin:     General: Skin is warm and dry.      Capillary Refill: Capillary refill takes less than 2 seconds.   Neurological:      General: No focal deficit present.      Mental Status: She is alert and oriented to person, place, and time.   Psychiatric:         Mood and Affect: Mood normal.         Behavior: Behavior normal.           ED Course & MDM   ED Course as of 10/09/24 2002   Wed Oct 09, 2024   1325 Patient was on 4L, titrated down to 3L and saturating appropriately. [AR]   1336 Patient holding steady on 3 L nasal cannula at 98%.  Patient has been titrated down to 2 L nasal cannula. [AR]   1350 Patient's blood pressures becoming soft, 500 cc fluids ordered. [AR]   1355 Patient was holding steady at 98% on 2 L nasal cannula.  1 L nasal cannula applied. [AR]   1410 Patient is now at 97% on 1 L.  Patient has been titrated to room air. [AR]   1422 Patient currently on room air saturating 94 to 95%.  Did discuss ablation she would have to successfully p.o. a trial of ambulation prior to discharge. [AR]      ED Course User Index  [AR] Tye Tobar PA-C         Diagnoses as of 10/09/24 2002   Acute hypoxic respiratory failure (Multi)   Aspiration pneumonia of right lower lobe due to gastric secretions (Multi)   Sinus tachycardia                 No data recorded                                 Medical Decision Making  Patient is a 29-year-old female presenting from the EGD office via EMS with concerns for aspiration.  X-ray, lab work ordered.  Conditions considered include but are not limited to: Pneumonia, aspiration.  DuoNeb ordered.  Patient is currently on 4 L nasal cannula.    I saw this patient in conjunction with Dr. Fraga.  CBC is without leukocytosis but does show signs of anemia with hemoglobin of 9.0, this appears to be chronic.  BMP without significant electrolyte abnormality or renal impairment.  Chest x-ray does show mild streaky changes to the left  lower chest showing mild bronchopneumonia.  Patient was significant tachycardic upon arrival and IV fluids ordered.  Patient has been successfully titrated to room air but again is tachycardic.  After IV fluids and antibiotic, patient is still tachycardic.    I spoke with admitting provider, Dr. Laughlin.  After our discussion, patient has been accepted for admission due to tachycardia and aspiration pneumonia.  As I deemed necessary from the patient's history, physical, laboratory imaging findings as well as ED course, I considered the above listed diagnoses.    Upon my evaluation, this patient had a high probability of imminent or life threatening deterioration due to hypoxia related to aspiration pneumonia, which required my direct attention, intervention, and personal management.    I personally provided 31 minutes to nonconcurrent critical care time exclusive of time spent on separately billable procedures.  Time includes review of laboratory data, radiology results, discussion with consultants, and monitoring for potential decompensation.  Interventions were performed as documented above.    Portions of this note made with Dragon software, please be mindful of potential grammatical errors.        Medications   oxygen (O2) therapy (21 percent inhalation Start 10/9/24 1939)   meropenem (Merrem) 1 g in sodium chloride 0.9% IV 50 mL (has no administration in time range)   ipratropium-albuteroL (Duo-Neb) 0.5-2.5 mg/3 mL nebulizer solution 3 mL (3 mL nebulization Given 10/9/24 1234)   sodium chloride 0.9 % bolus 500 mL (0 mL intravenous Stopped 10/9/24 1500)   sodium chloride 0.9 % bolus 500 mL (0 mL intravenous Stopped 10/9/24 1910)   doxycycline (Vibramycin) 100 mg in dextrose 5%  mL (0 mg intravenous Stopped 10/9/24 1910)       Labs Reviewed   CBC WITH AUTO DIFFERENTIAL - Abnormal       Result Value    WBC 5.3      nRBC 0.0      RBC 4.89      Hemoglobin 9.0 (*)     Hematocrit 32.7 (*)     MCV 67 (*)     MCH  18.4 (*)     MCHC 27.5 (*)     RDW 16.5 (*)     Platelets 229      Neutrophils % 64.7      Immature Granulocytes %, Automated 0.6      Lymphocytes % 23.7      Monocytes % 6.4      Eosinophils % 3.8      Basophils % 0.8      Neutrophils Absolute 3.44      Immature Granulocytes Absolute, Automated 0.03      Lymphocytes Absolute 1.26      Monocytes Absolute 0.34      Eosinophils Absolute 0.20      Basophils Absolute 0.04     BASIC METABOLIC PANEL - Abnormal    Glucose 113 (*)     Sodium 139      Potassium 4.1      Chloride 106      Bicarbonate 24      Anion Gap 13      Urea Nitrogen 12      Creatinine 0.58      eGFR >90      Calcium 8.6         XR chest 1 view   Final Result   1.  Mild streaky changes left lower chest, potentially minimal   atelectasis or mild bronchopneumonia.                  MACRO:   None        Signed by: Kevin Berry 10/9/2024 1:03 PM   Dictation workstation:   [id number]            Procedure  Procedures     Tye Tobar PA-C  10/09/24 2002

## 2024-10-09 NOTE — ASSESSMENT & PLAN NOTE
Fever white blood cells normal  Patient is allergic to penicillin started on meropenem  Follow-up with the sputum culture

## 2024-10-10 ENCOUNTER — TELEPHONE (OUTPATIENT)
Dept: ENDOCRINOLOGY | Facility: CLINIC | Age: 29
End: 2024-10-10

## 2024-10-10 ENCOUNTER — PHARMACY VISIT (OUTPATIENT)
Dept: PHARMACY | Facility: CLINIC | Age: 29
End: 2024-10-10
Payer: MEDICARE

## 2024-10-10 ENCOUNTER — DOCUMENTATION (OUTPATIENT)
Dept: ENDOCRINOLOGY | Facility: CLINIC | Age: 29
End: 2024-10-10

## 2024-10-10 VITALS
HEART RATE: 82 BPM | HEIGHT: 63 IN | RESPIRATION RATE: 16 BRPM | SYSTOLIC BLOOD PRESSURE: 116 MMHG | WEIGHT: 174.6 LBS | DIASTOLIC BLOOD PRESSURE: 77 MMHG | OXYGEN SATURATION: 96 % | BODY MASS INDEX: 30.94 KG/M2 | TEMPERATURE: 98.1 F

## 2024-10-10 DIAGNOSIS — R76.8 RED BLOOD CELL ANTIBODY POSITIVE: Primary | ICD-10-CM

## 2024-10-10 DIAGNOSIS — Z31.83 ENCOUNTER FOR ASSISTED REPRODUCTIVE FERTILITY CYCLE: ICD-10-CM

## 2024-10-10 LAB
ANION GAP SERPL CALCULATED.3IONS-SCNC: 13 MMOL/L (ref 10–20)
BUN SERPL-MCNC: 9 MG/DL (ref 6–23)
CALCIUM SERPL-MCNC: 8.8 MG/DL (ref 8.6–10.3)
CHLORIDE SERPL-SCNC: 107 MMOL/L (ref 98–107)
CO2 SERPL-SCNC: 24 MMOL/L (ref 21–32)
CREAT SERPL-MCNC: 0.55 MG/DL (ref 0.5–1.05)
EGFRCR SERPLBLD CKD-EPI 2021: >90 ML/MIN/1.73M*2
ERYTHROCYTE [DISTWIDTH] IN BLOOD BY AUTOMATED COUNT: 16.5 % (ref 11.5–14.5)
GLUCOSE SERPL-MCNC: 92 MG/DL (ref 74–99)
HCT VFR BLD AUTO: 31.6 % (ref 36–46)
HGB BLD-MCNC: 8.7 G/DL (ref 12–16)
MCH RBC QN AUTO: 18.2 PG (ref 26–34)
MCHC RBC AUTO-ENTMCNC: 27.5 G/DL (ref 32–36)
MCV RBC AUTO: 66 FL (ref 80–100)
NRBC BLD-RTO: 0 /100 WBCS (ref 0–0)
PLATELET # BLD AUTO: 239 X10*3/UL (ref 150–450)
POTASSIUM SERPL-SCNC: 4.2 MMOL/L (ref 3.5–5.3)
RBC # BLD AUTO: 4.78 X10*6/UL (ref 4–5.2)
SODIUM SERPL-SCNC: 140 MMOL/L (ref 136–145)
WBC # BLD AUTO: 9 X10*3/UL (ref 4.4–11.3)

## 2024-10-10 PROCEDURE — 2500000005 HC RX 250 GENERAL PHARMACY W/O HCPCS: Performed by: NURSE PRACTITIONER

## 2024-10-10 PROCEDURE — 36415 COLL VENOUS BLD VENIPUNCTURE: CPT | Performed by: NURSE PRACTITIONER

## 2024-10-10 PROCEDURE — 96366 THER/PROPH/DIAG IV INF ADDON: CPT

## 2024-10-10 PROCEDURE — RXMED WILLOW AMBULATORY MEDICATION CHARGE

## 2024-10-10 PROCEDURE — 2500000004 HC RX 250 GENERAL PHARMACY W/ HCPCS (ALT 636 FOR OP/ED): Mod: JZ | Performed by: NURSE PRACTITIONER

## 2024-10-10 PROCEDURE — 96376 TX/PRO/DX INJ SAME DRUG ADON: CPT

## 2024-10-10 PROCEDURE — 85027 COMPLETE CBC AUTOMATED: CPT | Performed by: NURSE PRACTITIONER

## 2024-10-10 PROCEDURE — 99239 HOSP IP/OBS DSCHRG MGMT >30: CPT | Performed by: NURSE PRACTITIONER

## 2024-10-10 PROCEDURE — 80048 BASIC METABOLIC PNL TOTAL CA: CPT | Performed by: NURSE PRACTITIONER

## 2024-10-10 PROCEDURE — G0378 HOSPITAL OBSERVATION PER HR: HCPCS

## 2024-10-10 RX ORDER — AZITHROMYCIN 500 MG/1
500 TABLET, FILM COATED ORAL DAILY
Qty: 5 TABLET | Refills: 0 | Status: SHIPPED | OUTPATIENT
Start: 2024-10-10 | End: 2024-10-18

## 2024-10-10 RX ORDER — CEFUROXIME AXETIL 500 MG/1
500 TABLET ORAL 2 TIMES DAILY
Qty: 10 TABLET | Refills: 0 | Status: SHIPPED | OUTPATIENT
Start: 2024-10-10 | End: 2024-10-18

## 2024-10-10 RX ORDER — LIDOCAINE AND PRILOCAINE 25; 25 MG/G; MG/G
CREAM TOPICAL DAILY
Qty: 30 G | Refills: 2 | Status: SHIPPED | OUTPATIENT
Start: 2024-10-10 | End: 2025-10-10

## 2024-10-10 RX ADMIN — MEROPENEM AND SODIUM CHLORIDE 1 G: 1 INJECTION, SOLUTION INTRAVENOUS at 03:56

## 2024-10-10 RX ADMIN — FAMOTIDINE 20 MG: 10 INJECTION, SOLUTION INTRAVENOUS at 09:46

## 2024-10-10 RX ADMIN — Medication 21 PERCENT: at 07:54

## 2024-10-10 ASSESSMENT — ENCOUNTER SYMPTOMS
MUSCULOSKELETAL NEGATIVE: 1
RESPIRATORY NEGATIVE: 1
ALLERGIC/IMMUNOLOGIC NEGATIVE: 1
GASTROINTESTINAL NEGATIVE: 1
NEUROLOGICAL NEGATIVE: 1
HEMATOLOGIC/LYMPHATIC NEGATIVE: 1
ENDOCRINE NEGATIVE: 1
TROUBLE SWALLOWING: 1
EYES NEGATIVE: 1
PSYCHIATRIC NEGATIVE: 1
CARDIOVASCULAR NEGATIVE: 1
CONSTITUTIONAL NEGATIVE: 1

## 2024-10-10 ASSESSMENT — PAIN SCALES - GENERAL
PAINLEVEL_OUTOF10: 0 - NO PAIN
PAINLEVEL_OUTOF10: 0 - NO PAIN

## 2024-10-10 NOTE — DISCHARGE SUMMARY
"Discharge Diagnosis  Acute hypoxic respiratory failure (Multi)    Issues Requiring Follow-Up      Discharge Meds     Medication List      START taking these medications     azithromycin 500 mg tablet; Commonly known as: Zithromax; Take 1 tablet   (500 mg) by mouth once daily for 5 days.   cefuroxime 500 mg tablet; Commonly known as: Ceftin; Take 1 tablet (500   mg) by mouth 2 times a day for 5 days.     CONTINUE taking these medications     acetaminophen 160 mg/5 mL liquid; Commonly known as: Tylenol   estradiol 2 mg tablet; Commonly known as: Estrace; Insert 1 tablet (2   mg) into the vagina 2 times a day.   progesterone 50 mg/mL injection; Inject 1.5 mL (75 mg) into the muscle   once daily. Inject into the muscle at the same time each morning as   directed per provider.   sertraline 25 mg tablet; Commonly known as: Zoloft; Take 1 tablet (25   mg) by mouth once daily.   transparent dressings 2 3/8 X 2 3/4 \" bandage; Use as directed to cover   lidocaine cream.       Test Results Pending At Discharge  Pending Labs       No current pending labs.            Hospital Course  Admitted for possible aspiration during EGD. Pt hypoxic in ED. Has remained on room air while inpatient. She denies chest pain, cough or dyspnea. Treating empirically with antibiotics for possible aspiration pneumonia. Was evaluated by GI and to follow-up for repeat EGD since procedure was aborted. Medically stable for discharge.        Pertinent Physical Exam At Time of Discharge  Physical Exam  HENT:      Head: Normocephalic and atraumatic.      Nose: Nose normal.      Mouth/Throat:      Mouth: Mucous membranes are moist.      Pharynx: Oropharynx is clear.   Eyes:      Extraocular Movements: Extraocular movements intact.      Pupils: Pupils are equal, round, and reactive to light.   Cardiovascular:      Rate and Rhythm: Normal rate and regular rhythm.      Pulses: Normal pulses.   Pulmonary:      Effort: Pulmonary effort is normal.      Breath " sounds: Normal breath sounds.   Abdominal:      General: Abdomen is flat. Bowel sounds are normal.      Palpations: Abdomen is soft.   Musculoskeletal:         General: Normal range of motion.   Skin:     General: Skin is warm and dry.      Capillary Refill: Capillary refill takes less than 2 seconds.   Neurological:      General: No focal deficit present.      Mental Status: She is oriented to person, place, and time.   Psychiatric:         Mood and Affect: Mood normal.         Outpatient Follow-Up  No future appointments.      Violetta Friend, APRN-CNP

## 2024-10-10 NOTE — TELEPHONE ENCOUNTER
Returned patient's call. Discussed Jerica's recommendation for mfm consult for anti body E seen on type and screen. Patient will call to make MFM appointment.   LARISSA VELOZ on 10/10/24 at 2:10 PM.

## 2024-10-10 NOTE — CONSULTS
Inpatient consult to Gastroenterology  Consult performed by: ABDULAZIZ Iniguez-CNP  Consult ordered by: ABDULAZIZ Herrera-MOLLY          Reason For Consult  odynophagia    History Of Present Illness  Vonda Francis is a 29 y.o. female  with history of GERD, who presented from with concern of aspiration, hypoxia and tachycardia. She was having EGD done yesterday by  for increased GERD symptoms, trouble swallowing. She also reports painful swallowing at times, with occasionally feeling like food gets stuck in her throat.  Dr. Peck was dilating the esophagus and at that time believe the patient had aspirated. Patient was under sedation of propofol and was not oxygenating appropriately. Reportedly she was in the high 70s to low 80s percent on arrival. Chest x-ray does show mild streaky changes to the left lower chest showing mild bronchopneumonia. Patient currently denies any SOB, and is on RA. She denies any abdominal pain, nausea, vomiting.     Past Medical History  She has a past medical history of Acid reflux, Encounter for gynecological examination (general) (routine) without abnormal findings (08/01/2016), Encounter for screening for infections with a predominantly sexual mode of transmission (08/01/2016), Female infertility, Gestational diabetes, Gestational hypertension ([facility]), Other nail disorders (08/01/2016), Personal history of diseases of the skin and subcutaneous tissue (07/16/2015), Personal history of diseases of the skin and subcutaneous tissue (08/01/2016), Personal history of other infectious and parasitic diseases (09/12/2016), Personal history of other infectious and parasitic diseases (09/12/2016), Plantar wart (09/13/2016), and Plantar wart (08/01/2016).    Surgical History  She has a past surgical history that includes Salpingectomy (Bilateral).     Social History  She reports that she has quit smoking. Her smoking use included cigarettes. She has been exposed to  "tobacco smoke. She has never used smokeless tobacco. She reports that she does not currently use alcohol. She reports that she does not use drugs.    Family History  Family History   Problem Relation Name Age of Onset    Bipolar disorder Mother      Other (CERVICAL CARCINOMA) Mother      Depression Mother      Asthma Mother      Diabetes Mother      Hypertension Mother      Hyperlipidemia Mother      Asthma Father      Depression Sister      Asthma Sister      Diabetes Maternal Grandfather      Lung cancer Maternal Grandfather      Bone cancer Maternal Grandfather          Allergies  Penicillins    Review of Systems   Constitutional: Negative.    HENT:  Positive for trouble swallowing.    Eyes: Negative.    Respiratory: Negative.     Cardiovascular: Negative.    Gastrointestinal: Negative.    Endocrine: Negative.    Genitourinary: Negative.    Musculoskeletal: Negative.    Skin: Negative.    Allergic/Immunologic: Negative.    Neurological: Negative.    Hematological: Negative.    Psychiatric/Behavioral: Negative.          Physical Exam  HENT:      Head: Normocephalic.      Right Ear: Tympanic membrane normal.      Nose: Nose normal.      Mouth/Throat:      Mouth: Mucous membranes are moist.   Eyes:      Pupils: Pupils are equal, round, and reactive to light.   Cardiovascular:      Rate and Rhythm: Normal rate.   Pulmonary:      Breath sounds: Normal breath sounds.   Abdominal:      Palpations: Abdomen is soft.   Musculoskeletal:         General: Normal range of motion.      Cervical back: Normal range of motion.   Skin:     General: Skin is warm.   Neurological:      General: No focal deficit present.      Mental Status: She is alert.   Psychiatric:         Mood and Affect: Mood normal.          Last Recorded Vitals  Blood pressure 116/77, pulse 82, temperature 36.7 °C (98.1 °F), temperature source Oral, resp. rate 16, height 1.6 m (5' 3\"), weight 79.2 kg (174 lb 9.7 oz), last menstrual period 09/15/2024, SpO2 96%, " unknown if currently breastfeeding.    Relevant Results  XR chest 1 view    Result Date: 10/9/2024  Interpreted By:  Kevin Berry, STUDY: XR CHEST 1 VIEW;  10/9/2024 12:41 pm   INDICATION: Signs/Symptoms:SOB.     COMPARISON: None.   ACCESSION NUMBER(S): [id number]   ORDERING CLINICIAN: AKANKSHA LOCKHART   FINDINGS: AP radiograph of the chest was provided.   Overlapping radiopaque leads.   CARDIOMEDIASTINAL SILHOUETTE: Cardiomediastinal silhouette is normal in size and configuration.   LUNGS: Minimal streaky densities left lower chest potentially atelectasis or mild bronchopneumonia. No evident edema or effusion.   ABDOMEN: No remarkable upper abdominal findings.   BONES: No acute osseous changes.       1.  Mild streaky changes left lower chest, potentially minimal atelectasis or mild bronchopneumonia.       MACRO: None   Signed by: Kevin Berry 10/9/2024 1:03 PM Dictation workstation:   [id number]      Scheduled medications  famotidine, 20 mg, intravenous, BID  meropenem, 1 g, intravenous, q8h  oxygen, , inhalation, Continuous - Inhalation      Continuous medications     PRN medications  PRN medications: acetaminophen **OR** acetaminophen **OR** acetaminophen, bisacodyl, bisacodyl, dextromethorphan-guaifenesin, ondansetron ODT **OR** ondansetron, polyethylene glycol, prochlorperazine **OR** prochlorperazine **OR** prochlorperazine  Results for orders placed or performed during the hospital encounter of 10/09/24 (from the past 24 hour(s))   CBC and Auto Differential   Result Value Ref Range    WBC 5.3 4.4 - 11.3 x10*3/uL    nRBC 0.0 0.0 - 0.0 /100 WBCs    RBC 4.89 4.00 - 5.20 x10*6/uL    Hemoglobin 9.0 (L) 12.0 - 16.0 g/dL    Hematocrit 32.7 (L) 36.0 - 46.0 %    MCV 67 (L) 80 - 100 fL    MCH 18.4 (L) 26.0 - 34.0 pg    MCHC 27.5 (L) 32.0 - 36.0 g/dL    RDW 16.5 (H) 11.5 - 14.5 %    Platelets 229 150 - 450 x10*3/uL    Neutrophils % 64.7 40.0 - 80.0 %    Immature Granulocytes %, Automated 0.6 0.0 - 0.9 %     Lymphocytes % 23.7 13.0 - 44.0 %    Monocytes % 6.4 2.0 - 10.0 %    Eosinophils % 3.8 0.0 - 6.0 %    Basophils % 0.8 0.0 - 2.0 %    Neutrophils Absolute 3.44 1.20 - 7.70 x10*3/uL    Immature Granulocytes Absolute, Automated 0.03 0.00 - 0.70 x10*3/uL    Lymphocytes Absolute 1.26 1.20 - 4.80 x10*3/uL    Monocytes Absolute 0.34 0.10 - 1.00 x10*3/uL    Eosinophils Absolute 0.20 0.00 - 0.70 x10*3/uL    Basophils Absolute 0.04 0.00 - 0.10 x10*3/uL   Basic metabolic panel   Result Value Ref Range    Glucose 113 (H) 74 - 99 mg/dL    Sodium 139 136 - 145 mmol/L    Potassium 4.1 3.5 - 5.3 mmol/L    Chloride 106 98 - 107 mmol/L    Bicarbonate 24 21 - 32 mmol/L    Anion Gap 13 10 - 20 mmol/L    Urea Nitrogen 12 6 - 23 mg/dL    Creatinine 0.58 0.50 - 1.05 mg/dL    eGFR >90 >60 mL/min/1.73m*2    Calcium 8.6 8.6 - 10.3 mg/dL   CBC   Result Value Ref Range    WBC 9.0 4.4 - 11.3 x10*3/uL    nRBC 0.0 0.0 - 0.0 /100 WBCs    RBC 4.78 4.00 - 5.20 x10*6/uL    Hemoglobin 8.7 (L) 12.0 - 16.0 g/dL    Hematocrit 31.6 (L) 36.0 - 46.0 %    MCV 66 (L) 80 - 100 fL    MCH 18.2 (L) 26.0 - 34.0 pg    MCHC 27.5 (L) 32.0 - 36.0 g/dL    RDW 16.5 (H) 11.5 - 14.5 %    Platelets 239 150 - 450 x10*3/uL   Basic metabolic panel   Result Value Ref Range    Glucose 92 74 - 99 mg/dL    Sodium 140 136 - 145 mmol/L    Potassium 4.2 3.5 - 5.3 mmol/L    Chloride 107 98 - 107 mmol/L    Bicarbonate 24 21 - 32 mmol/L    Anion Gap 13 10 - 20 mmol/L    Urea Nitrogen 9 6 - 23 mg/dL    Creatinine 0.55 0.50 - 1.05 mg/dL    eGFR >90 >60 mL/min/1.73m*2    Calcium 8.8 8.6 - 10.3 mg/dL        Assessment/Plan     Odynophagia/Aspiration pneumonia   S/P EGD. Chest x-ray does show mild streaky changes to the left lower chest showing mild bronchopneumonia.  Started on antibiotics  No leukocytosis   Will most likely need repeat EGD at some point due to EGD being aborted. Patient currently remains stable and on RA. Will discuss when able to repeat EGD. Swallow Eval ordered. PPI IV  bid. Continue supportive measures.     Samaria Del Angel, APRN-CNP

## 2024-10-10 NOTE — HOSPITAL COURSE
Vonda EMETERIO Penny   This is a 29 years old female with history of GERD, who presented from EGD office to ED with concern of aspiration hypoxia and tachycardia.  Reported that GI was dilating the esophagus and believes that patient has aspirated.  Admitted with aspiration pneumonia, Odynophagia  and tachycardia.  Initially she was hypoxic but now she is on room air.  On IV antibiotic GI consulted and speech therapy. discharge home

## 2024-10-10 NOTE — PROGRESS NOTES
"Speech-Language Pathology                 Therapy Communication Note    Patient Name: Vonda Francis  MRN: [id number]  Department: [facility] 3 S  Room: 317/317-A  Today's Date: 10/10/2024     Discipline: Speech Language Pathology    Missed Time: Cancel    Comment: Pt is pleasant and a good historian. Describes the burdensome esophageal dysphagia that she has been dealing with, reporting symptoms of food getting \"lodged\" around the level of the sternum, feeling of pain resonating from the level of the sternum with PO intake, and frequent episodes of emesis. She describes that her difficulties started as chronic GERD, which she was managing well. Reports good knowledge of esophageal dysphagia/reflux strategies and dietary modifications. She was undergoing EGD for esophageal dilation when she had an episode of aspiration that required her hospitalization. She reports that she was given a diagnosis of EOE at the end of her examination, which would correlate with her reported symptom set. Pt denies any symptoms of oral or pharyngeal dysphagia. Following discussion with pt, pt defers participation in evaluation of oropharyngeal swallow function. Pt's symptoms can be best managed by GI, who is actively on case as it stands. No further SLP services are warranted at this time.          "

## 2024-10-10 NOTE — CARE PLAN
Problem: Pain - Adult  Goal: Verbalizes/displays adequate comfort level or baseline comfort level  Outcome: Met     Problem: Safety - Adult  Goal: Free from fall injury  Outcome: Met     Problem: Pain  Goal: Takes deep breaths with improved pain control throughout the shift  Outcome: Met     Problem: Pain  Goal: Turns in bed with improved pain control throughout the shift  Outcome: Met     Problem: Pain  Goal: Walks with improved pain control throughout the shift  Outcome: Met   The patient's goals for the shift include feel better    The clinical goals for the shift include breathe better lower HR    Over the shift, the patient did make progress toward the above goals.

## 2024-10-10 NOTE — TELEPHONE ENCOUNTER
Reason for call:   Notes: pt needs help understanding her mychart message from Jerica WELLS Did not want to schedule MFM appt until a nurse calls her back

## 2024-10-10 NOTE — PROGRESS NOTES
MD Jerica Neri APRN-CNP  Hey!    I think it'd be worth seeing her for a preconception visit.  Anti-E can cause fetal anemia and we typically follow titers and/or undergo fetal surveillance so its worth a conversation about what that looks like and testing options for her partner etc. before conception.    Rick          Previous Messages       ----- Message -----  From: SHUN Vo  Sent: 10/9/2024   4:02 PM EDT  To: Chavez Coulter MD    This is a patient of ours who came back positive for anti E in her T&S. Looks like low risk for pregnancy. Would you need to see her for preconception at all?    SHUN Vo    Called patient to discuss. Left a message and will send her a CVRxt message.    Jerica Blanco 10/10/24 12:25 PM

## 2024-10-17 ENCOUNTER — TELEPHONE (OUTPATIENT)
Dept: ENDOCRINOLOGY | Facility: CLINIC | Age: 29
End: 2024-10-17
Payer: COMMERCIAL

## 2024-10-17 ENCOUNTER — DOCUMENTATION (OUTPATIENT)
Dept: GASTROENTEROLOGY | Facility: HOSPITAL | Age: 29
End: 2024-10-17
Payer: COMMERCIAL

## 2024-10-17 NOTE — TELEPHONE ENCOUNTER
Returned patient call, patient is expecting menses in the next one to two weeks, was hoping to go into FET cycle with this period. Has MFM consult scheduled for 11/13 to discuss her antibody E findings and what monitoring would be needed in pregnancy.    Discussed timing with Dr. Loza:  - If patient gets menses 10/24 or before we could have her start an OCP and then have her stop it around 11/8 (to ensure there is plenty of time to get period prior to lab closure), if she gets her period prior to MFM visit could always start on estrace (if for some reason needed cancelled pending MFM appointment, ok she only took a few days of estrace).    - If patient gets her period 10/25 or after, could plan for just FET set up and ensuring FET takes place after 11/13 to ensure counseling is done prior to FET. Per Dr. Loza does not think this counseling/results will effect proceeding with a FET but need to be counseled.       Pt to call with menses to coordinate depending how it falls and also reminded to complete IVF consent, treatment plan and reprotech packet.    10/17/24 at 3:36 PM - Samaria Montilla RN

## 2024-10-17 NOTE — TELEPHONE ENCOUNTER
Reason for call:   Notes: Patient wants to talk to a nurse about her MFM appt related to her cycle plan

## 2024-10-17 NOTE — PROGRESS NOTES
Dr. Bernard Fuentes's office received a referral for this patient from Dr. Taqueria Peck for evaluation of proximal esophageal dilation. Dr. Pierre Olsen reviewed the referral on 10/15/2024. Per Dr. Pitts, OK to schedule an EGD with anesthesia for 30 minutes.    Contacted the referring office on 10/14/2024 to send patient's most recent EGD report. Reanna Esposito was also notified to call and schedule this patient. Contact Sandra Jesus RN at [phone] for any questions.      See below for supporting clinical information from the referral sent by Dr. Dr. aTqueria Peck's office and from medical records:    Evaluation of proximal esophageal dilation  -Odynophagia  -Chronic GERD  -Esophageal dysphagia       ED to Hosp-Admission  Discharged  10/9/2024 - 10/10/2024 (23 hours)  [facility]      Tonya Lopes MD  Last attending  Treatment team       Acute hypoxic respiratory failure (Multi)  Principal problem    Discharge Summary  SHUN Gardiner (Nurse Practitioner)  Internal Medicine  Cosigned by: Tonya Lopes MD at 10/10/2024  4:22 PM  Discharge Diagnosis  Acute hypoxic respiratory failure (Multi)    Hospital Course  Admitted for possible aspiration during EGD. Pt hypoxic in ED. Has remained on room air while inpatient. She denies chest pain, cough or dyspnea. Treating empirically with antibiotics for possible aspiration pneumonia. Was evaluated by GI and to follow-up for repeat EGD since procedure was aborted. Medically stable for discharge.        Samaria A Primiano, APRN-CNP   Nurse Practitioner  Gastroenterology    Consults     Signed    Date of Service: 10/10/2024  8:26 AM  Consult Orders  Inpatient consult to Gastroenterology [[id number]] ordered by SHUN Herrera      Signed      Expand All Collapse All    Inpatient consult to Gastroenterology  Consult performed by: SHUN Iniguez  Consult ordered by: SHUN Herrera             Reason  For Consult  odynophagia    History Of Present Illness  Vonda Francis is a 29 y.o. female  with history of GERD, who presented from with concern of aspiration, hypoxia and tachycardia. She was having EGD done yesterday by  for increased GERD symptoms, trouble swallowing. She also reports painful swallowing at times, with occasionally feeling like food gets stuck in her throat.  Dr. Peck was dilating the esophagus and at that time believe the patient had aspirated. Patient was under sedation of propofol and was not oxygenating appropriately. Reportedly she was in the high 70s to low 80s percent on arrival. Chest x-ray does show mild streaky changes to the left lower chest showing mild bronchopneumonia. Patient currently denies any SOB, and is on RA. She denies any abdominal pain, nausea, vomiting.      Past Medical History  She has a past medical history of Acid reflux, Encounter for gynecological examination (general) (routine) without abnormal findings (08/01/2016), Encounter for screening for infections with a predominantly sexual mode of transmission (08/01/2016), Female infertility, Gestational diabetes, Gestational hypertension ([facility]), Other nail disorders (08/01/2016), Personal history of diseases of the skin and subcutaneous tissue (07/16/2015), Personal history of diseases of the skin and subcutaneous tissue (08/01/2016), Personal history of other infectious and parasitic diseases (09/12/2016), Personal history of other infectious and parasitic diseases (09/12/2016), Plantar wart (09/13/2016), and Plantar wart (08/01/2016).    Surgical History  She has a past surgical history that includes Salpingectomy (Bilateral).    XR chest 1 view    Result Date: 10/9/2024  Interpreted By:  Kevin Berry, STUDY: XR CHEST 1 VIEW;  10/9/2024 12:41 pm   INDICATION: Signs/Symptoms:SOB.     COMPARISON: None.   ACCESSION NUMBER(S): [id number]   ORDERING CLINICIAN: AKANKSHA LOCKHART   FINDINGS: SARAH  radiograph of the chest was provided.   Overlapping radiopaque leads.   CARDIOMEDIASTINAL SILHOUETTE: Cardiomediastinal silhouette is normal in size and configuration.   LUNGS: Minimal streaky densities left lower chest potentially atelectasis or mild bronchopneumonia. No evident edema or effusion.   ABDOMEN: No remarkable upper abdominal findings.   BONES: No acute osseous changes.        1.  Mild streaky changes left lower chest, potentially minimal atelectasis or mild bronchopneumonia.       MACRO: None   Signed by: Kevin Berry 10/9/2024 1:03 PM Dictation workstation:   [id number]      Assessment/Plan  Odynophagia/Aspiration pneumonia   S/P EGD. Chest x-ray does show mild streaky changes to the left lower chest showing mild bronchopneumonia.  Started on antibiotics  No leukocytosis   Will most likely need repeat EGD at some point due to EGD being aborted. Patient currently remains stable and on RA. Will discuss when able to repeat EGD. Swallow Eval ordered. PPI IV bid. Continue supportive measures.      Samaria Del Angel, APRN-CNP

## 2024-10-18 ENCOUNTER — OFFICE VISIT (OUTPATIENT)
Dept: GASTROENTEROLOGY | Facility: CLINIC | Age: 29
End: 2024-10-18
Payer: COMMERCIAL

## 2024-10-18 VITALS
DIASTOLIC BLOOD PRESSURE: 63 MMHG | SYSTOLIC BLOOD PRESSURE: 96 MMHG | HEART RATE: 76 BPM | WEIGHT: 172 LBS | TEMPERATURE: 97.7 F | BODY MASS INDEX: 30.48 KG/M2 | HEIGHT: 63 IN

## 2024-10-18 DIAGNOSIS — R13.19 ESOPHAGEAL DYSPHAGIA: ICD-10-CM

## 2024-10-18 DIAGNOSIS — K21.9 GASTROESOPHAGEAL REFLUX DISEASE, UNSPECIFIED WHETHER ESOPHAGITIS PRESENT: Primary | ICD-10-CM

## 2024-10-18 DIAGNOSIS — R13.10 ODYNOPHAGIA: ICD-10-CM

## 2024-10-18 PROCEDURE — 99214 OFFICE O/P EST MOD 30 MIN: CPT | Performed by: NURSE PRACTITIONER

## 2024-10-18 PROCEDURE — 3008F BODY MASS INDEX DOCD: CPT | Performed by: NURSE PRACTITIONER

## 2024-10-18 PROCEDURE — 99204 OFFICE O/P NEW MOD 45 MIN: CPT | Performed by: NURSE PRACTITIONER

## 2024-10-18 ASSESSMENT — ENCOUNTER SYMPTOMS
HALLUCINATIONS: 0
SHORTNESS OF BREATH: 0
NERVOUS/ANXIOUS: 0
CHILLS: 0
FLANK PAIN: 0
SORE THROAT: 0
AGITATION: 0
COUGH: 0
WHEEZING: 0
FEVER: 0
PALPITATIONS: 0
BACK PAIN: 0
JOINT SWELLING: 0
NUMBNESS: 0
MYALGIAS: 0
ADENOPATHY: 0
HEADACHES: 0
EYE PAIN: 0
PHOTOPHOBIA: 0
DYSURIA: 0
WEAKNESS: 0
ARTHRALGIAS: 0
FATIGUE: 0
HEMATURIA: 0
DIZZINESS: 0
LIGHT-HEADEDNESS: 0
FREQUENCY: 0
DIAPHORESIS: 0

## 2024-10-18 NOTE — PATIENT INSTRUCTIONS
Thanks for coming to the GI clinic.     I would like you to get an EGD.     You can try OTC Gaviscon.     Follow up in clinic 3 weeks after completion of the EGD.

## 2024-10-18 NOTE — PROGRESS NOTES
Subjective   Patient ID: Vonda Francis is a 29 y.o. female who presents for acid reflux.     This is a 29 year old WF with history of GERD and hypothyroidism  who is presenting to the GI clinic for an initial visit.     History per pt, , and review of EMR    Pt is accompanied to this visit by her .    Of note, on 10/9/24 pt was admitted to [facility] for suspected aspiration during an attempted EGD by Dr. Peck for GERD symptoms and esophageal dysphagia. It sounds as if esophageal dilation was in the processes, but it had to be aborted due to aspiration. She was treated for aspiration pneumonia.     Reports issues with acid reflux since childhood which have gotten worse in the past 1-2 years. She endorses heartburn, regurgitation, and severe esophageal dysphagia to food and pills.     The dysphagia leads to vomiting.     ROS positive for odynophagia.     Denies unintentional weight loss, hematemesis, hematochezia, and melena.     Pt states Dr. Peck wants her to get a repeat EGD in the hospital setting.     Pt would like to get repeat EGD prior to planned IVF she has coming up in 11/2024. She already has 2 children through IVF.       Past medical history:  See above     Past surgical history:   None    Family history:   No GI cancers   Mother-GERD, hiatal hernia repair   Maternal grandmother- GERD, hiatal hernia (awaiting repair)     Social history:  Rarely drinks alcohol   Denies use of tobacco and illicit drugs    with children     Review of Systems   Constitutional:  Negative for chills, diaphoresis, fatigue and fever.   HENT:  Negative for congestion, ear pain, hearing loss, sneezing and sore throat.    Eyes:  Negative for photophobia, pain and visual disturbance.   Respiratory:  Negative for cough, shortness of breath and wheezing.    Cardiovascular:  Negative for chest pain, palpitations and leg swelling.   Endocrine: Negative for cold intolerance and heat intolerance.  "  Genitourinary:  Negative for dysuria, flank pain, frequency and hematuria.   Musculoskeletal:  Negative for arthralgias, back pain, gait problem, joint swelling and myalgias.   Skin:  Negative for rash.   Neurological:  Negative for dizziness, syncope, weakness, light-headedness, numbness and headaches.   Hematological:  Negative for adenopathy.   Psychiatric/Behavioral:  Negative for agitation and hallucinations. The patient is not nervous/anxious.        Allergies   Allergen Reactions    Penicillins Hives and Unknown     Current Outpatient Medications   Medication Sig Dispense Refill    acetaminophen (Tylenol) 160 mg/5 mL liquid Take 650 mg by mouth every 4 hours if needed for mild pain (1 - 3).      progesterone 50 mg/mL injection Inject 1.5 mL (75 mg) into the muscle once daily. Inject into the muscle at the same time each morning as directed per provider. 50 mL 3    estradiol (Estrace) 2 mg tablet Insert 1 tablet (2 mg) into the vagina 2 times a day. (Patient not taking: Reported on 10/18/2024)      lidocaine-prilocaine (Emla) 2.5-2.5 % cream Apply to treatment area 1 hour prior to injection. (Patient not taking: Reported on 10/18/2024) 30 g 2    transparent dressings 2 3/8 X 2 3/4 \" bandage Use as directed to cover lidocaine cream. 30 each 3     No current facility-administered medications for this visit.        Objective     BP 96/63   Pulse 76   Temp 36.5 °C (97.7 °F)   Ht 1.6 m (5' 3\")   Wt 78 kg (172 lb)   LMP 09/15/2024   BMI 30.47 kg/m²     Physical Exam  Constitutional:       General: She is not in acute distress.     Appearance: Normal appearance.   HENT:      Head: Normocephalic and atraumatic.   Eyes:      Conjunctiva/sclera: Conjunctivae normal.   Cardiovascular:      Rate and Rhythm: Normal rate and regular rhythm.      Heart sounds: No murmur heard.     No gallop.   Pulmonary:      Effort: Pulmonary effort is normal.      Breath sounds: Normal breath sounds.   Abdominal:      General: " Bowel sounds are normal. There is no distension.      Tenderness: There is no abdominal tenderness. There is no guarding.   Musculoskeletal:         General: No swelling or deformity. Normal range of motion.      Cervical back: Normal range of motion. No rigidity.   Skin:     General: Skin is warm and dry.      Coloration: Skin is not jaundiced.      Findings: No lesion or rash.   Neurological:      General: No focal deficit present.      Mental Status: She is alert and oriented to person, place, and time.   Psychiatric:         Mood and Affect: Mood normal.         Assessment/Plan   Problem List Items Addressed This Visit       GERD (gastroesophageal reflux disease) - Primary    Relevant Orders    Esophagogastroduodenoscopy (EGD)    Odynophagia    Relevant Orders    Esophagogastroduodenoscopy (EGD)     Other Visit Diagnoses       Esophageal dysphagia        Relevant Orders    Esophagogastroduodenoscopy (EGD)           GERD with esophageal dysphagia to solids and odynophagia:   - will proceed with EGD with MAC to evaluate for any GERD related complication such as esophagitis and peptic stricture   - will attempt to arrange EGD at [facility] with Dr. De La Cruz  - recommend liquid Gaviscon while awaiting EGD (as she cannot swallow pills)    2. Follow up:  - return to clinic 3 weeks after completion of EGD     *discussed with Dr. De La Cruz in clinic today

## 2024-10-25 ENCOUNTER — TELEPHONE (OUTPATIENT)
Dept: PREADMISSION TESTING | Facility: HOSPITAL | Age: 29
End: 2024-10-25
Payer: COMMERCIAL

## 2024-10-25 ENCOUNTER — TELEPHONE (OUTPATIENT)
Dept: ENDOCRINOLOGY | Facility: CLINIC | Age: 29
End: 2024-10-25
Payer: COMMERCIAL

## 2024-10-25 DIAGNOSIS — N97.9 FEMALE INFERTILITY: ICD-10-CM

## 2024-10-25 RX ORDER — ESTRADIOL 2 MG/1
6 TABLET ORAL DAILY
Qty: 90 TABLET | Refills: 2 | Status: SHIPPED | OUTPATIENT
Start: 2024-10-25 | End: 2025-10-25

## 2024-10-25 NOTE — TELEPHONE ENCOUNTER
SURGERY PRE-OPERATIVE INSTRUCTIONS    *You will receive a phone call the day before your procedure  after 2pm, (or the Friday before your surgery if scheduled on a Monday.) Generally the hospital will be calling you with this information after that time.    *You are not to eat after midnight the night before the surgery. You may have 8oz of a clear liquid up until 2 hours prior to arriving to the hospital. The exception is with medications you were instructed to take day of surgery.    *You may take tylenol for pain/discomfort as needed.     *Stop taking all aspirin products, ibuprofen (motrin/advil), naproxen (aleve/naprosyn) for one week prior to surgery.    *Stop taking all vitamins and supplements one week prior to surgery.     *You should not have alcoholic beverages for 24 hours before surgery.     *You should not smoke 24 hours prior to surgery.     *To help prevent surgical infections bathe/shower with Dial soap the evening before surgery.    *You can wear deodorant but no lotion, powder, or perfume/cologne. You should remove all make-up and nail polish at home.    *If you wear glasses, please bring a case for the glasses with you.    *You will be asked to remove dentures and contacts.     *Please leave all valuables at home.    *You should wear loose, comfortable clothing that will accommodate bandages and/or casts.    *You should notify your doctor of any change in your condition (fever, cold, rash, etc). Surgery may need to be re-scheduled until a time you are in better health.    *A responsible adult is required to accompany you to and from the hospital if you are receiving anesthesia or a sedative. Patients are not permitted to drive for 24 hours after anesthesia.     *You can use the [email or web address] parking if you wish.     *If you have any further questions please call [phone].   
no

## 2024-10-25 NOTE — TELEPHONE ENCOUNTER
Patient called with menses for FET setup.   Per documentation on 10/17/24- - If patient gets her period 10/25 or after, could plan for just FET set up and ensuring FET takes place after 11/13 to ensure counseling is done prior to FET. Per Dr. Loza does not think this counseling/results will effect proceeding with a FET but need to be counseled.     LMP: 10/25/24- just spotting today! Likely full flow on 10/26/24  Protocol: Programmed : estrace 6mg  LETA 75mg IM      Would prefer a female for transfer- Dr. Messina, Dr. Loza, Dr. Vargas.- history of trauma.    Tentative lining check: 11/7/24  Tentative progesterone start: 11/10/24  Tentative transfer date: 11/15/24 with Dr. Loza   Number of days of progesterone for transfer: 6  Treatment plan confirmed: partner needs to sign   In waiver confirmed: partner needs to sign   BP needs signed off. Patient to go for Chlamydia/Gonorrhea testing tomorrow, patient to have forms completed (has questions about [employer] storage), patient to meet with [facility] on 11/13/24.   BP NEEDS TO BE REVIEWED AFTER MFM APPT ON 11/13 PRIOR TO ET ON 11/15  Embryo # confirmed: TBD   Embryo # to transfer: 2 (Dr. Messina confirmed transfer of 2 is appropriate on 10/7/24)  Mini Flores  10/25/2024  12:16 PM     Spoke to patient with nurse manager and reviewed with Dr. Vargas. Nurse manager reviewed [employer] storage with patient and five year agreement. Patient aware she will need to put a card on file for [employer] Patient is to complete FET packet and [employer] embryo couple form. Patient is to go tomorrow ASAP for urine sample for chlamydia gonorrhea testing. Patient is to meet with [facility] on 11/13/24 and BP will need to be reviewed after this for final clearance. Patient added to IVF calendar. Patient added to huddle for FET set up for 10/28/24. Patient sent follow up my chart message   MINI FLORES on 10/25/24 at 5:04 PM.

## 2024-10-28 ENCOUNTER — ANESTHESIA EVENT (OUTPATIENT)
Dept: OPERATING ROOM | Facility: HOSPITAL | Age: 29
End: 2024-10-28
Payer: COMMERCIAL

## 2024-10-28 ENCOUNTER — LAB (OUTPATIENT)
Dept: LAB | Facility: LAB | Age: 29
End: 2024-10-28
Payer: COMMERCIAL

## 2024-10-28 DIAGNOSIS — Z11.3 SCREENING FOR STDS (SEXUALLY TRANSMITTED DISEASES): ICD-10-CM

## 2024-10-28 PROCEDURE — 87491 CHLMYD TRACH DNA AMP PROBE: CPT

## 2024-10-28 PROCEDURE — 87591 N.GONORRHOEAE DNA AMP PROB: CPT

## 2024-10-29 ENCOUNTER — ANESTHESIA (OUTPATIENT)
Dept: OPERATING ROOM | Facility: HOSPITAL | Age: 29
End: 2024-10-29
Payer: COMMERCIAL

## 2024-10-29 ENCOUNTER — HOSPITAL ENCOUNTER (OUTPATIENT)
Dept: OPERATING ROOM | Facility: HOSPITAL | Age: 29
Setting detail: OUTPATIENT SURGERY
Discharge: HOME | End: 2024-10-29
Payer: COMMERCIAL

## 2024-10-29 VITALS
DIASTOLIC BLOOD PRESSURE: 45 MMHG | BODY MASS INDEX: 30.84 KG/M2 | HEIGHT: 63 IN | TEMPERATURE: 96.8 F | WEIGHT: 174.05 LBS | RESPIRATION RATE: 16 BRPM | HEART RATE: 68 BPM | OXYGEN SATURATION: 100 % | SYSTOLIC BLOOD PRESSURE: 94 MMHG

## 2024-10-29 DIAGNOSIS — K20.90 ESOPHAGITIS: Primary | ICD-10-CM

## 2024-10-29 DIAGNOSIS — R13.10 ODYNOPHAGIA: ICD-10-CM

## 2024-10-29 DIAGNOSIS — K20.0 EOSINOPHILIC ESOPHAGITIS: Primary | ICD-10-CM

## 2024-10-29 DIAGNOSIS — K21.9 GASTROESOPHAGEAL REFLUX DISEASE, UNSPECIFIED WHETHER ESOPHAGITIS PRESENT: ICD-10-CM

## 2024-10-29 DIAGNOSIS — R13.19 ESOPHAGEAL DYSPHAGIA: ICD-10-CM

## 2024-10-29 DIAGNOSIS — K20.0 EOSINOPHILIC ESOPHAGITIS: ICD-10-CM

## 2024-10-29 LAB
C TRACH RRNA SPEC QL NAA+PROBE: NEGATIVE
N GONORRHOEA DNA SPEC QL PROBE+SIG AMP: NEGATIVE

## 2024-10-29 PROCEDURE — 2500000004 HC RX 250 GENERAL PHARMACY W/ HCPCS (ALT 636 FOR OP/ED): Performed by: NURSE ANESTHETIST, CERTIFIED REGISTERED

## 2024-10-29 PROCEDURE — 7100000009 HC PHASE TWO TIME - INITIAL BASE CHARGE: Performed by: ANESTHESIOLOGY

## 2024-10-29 PROCEDURE — 2500000004 HC RX 250 GENERAL PHARMACY W/ HCPCS (ALT 636 FOR OP/ED): Performed by: INTERNAL MEDICINE

## 2024-10-29 PROCEDURE — 3600000002 HC OR TIME - INITIAL BASE CHARGE - PROCEDURE LEVEL TWO: Performed by: ANESTHESIOLOGY

## 2024-10-29 PROCEDURE — 2720000007 HC OR 272 NO HCPCS: Performed by: ANESTHESIOLOGY

## 2024-10-29 PROCEDURE — 3700000001 HC GENERAL ANESTHESIA TIME - INITIAL BASE CHARGE: Performed by: ANESTHESIOLOGY

## 2024-10-29 PROCEDURE — 43239 EGD BIOPSY SINGLE/MULTIPLE: CPT | Performed by: INTERNAL MEDICINE

## 2024-10-29 PROCEDURE — C1726 CATH, BAL DIL, NON-VASCULAR: HCPCS | Performed by: ANESTHESIOLOGY

## 2024-10-29 PROCEDURE — 7100000010 HC PHASE TWO TIME - EACH INCREMENTAL 1 MINUTE: Performed by: ANESTHESIOLOGY

## 2024-10-29 PROCEDURE — 96372 THER/PROPH/DIAG INJ SC/IM: CPT | Performed by: INTERNAL MEDICINE

## 2024-10-29 PROCEDURE — 43249 ESOPH EGD DILATION <30 MM: CPT | Performed by: INTERNAL MEDICINE

## 2024-10-29 PROCEDURE — 3700000002 HC GENERAL ANESTHESIA TIME - EACH INCREMENTAL 1 MINUTE: Performed by: ANESTHESIOLOGY

## 2024-10-29 PROCEDURE — 3600000007 HC OR TIME - EACH INCREMENTAL 1 MINUTE - PROCEDURE LEVEL TWO: Performed by: ANESTHESIOLOGY

## 2024-10-29 PROCEDURE — A43249 PR EDG BALLOON DILATION ESOPHAGUS <30 MM DIAM: Performed by: NURSE ANESTHETIST, CERTIFIED REGISTERED

## 2024-10-29 PROCEDURE — A43249 PR EDG BALLOON DILATION ESOPHAGUS <30 MM DIAM: Performed by: ANESTHESIOLOGY

## 2024-10-29 RX ORDER — SODIUM CHLORIDE 9 MG/ML
INJECTION, SOLUTION INTRAVENOUS CONTINUOUS PRN
Status: DISCONTINUED | OUTPATIENT
Start: 2024-10-29 | End: 2024-10-29

## 2024-10-29 RX ORDER — MIDAZOLAM HYDROCHLORIDE 1 MG/ML
INJECTION INTRAMUSCULAR; INTRAVENOUS AS NEEDED
Status: DISCONTINUED | OUTPATIENT
Start: 2024-10-29 | End: 2024-10-29

## 2024-10-29 RX ORDER — LIDOCAINE HYDROCHLORIDE 10 MG/ML
INJECTION, SOLUTION EPIDURAL; INFILTRATION; INTRACAUDAL; PERINEURAL AS NEEDED
Status: DISCONTINUED | OUTPATIENT
Start: 2024-10-29 | End: 2024-10-29

## 2024-10-29 RX ORDER — ALBUTEROL SULFATE 0.83 MG/ML
2.5 SOLUTION RESPIRATORY (INHALATION) ONCE AS NEEDED
Status: DISCONTINUED | OUTPATIENT
Start: 2024-10-29 | End: 2024-10-30 | Stop reason: HOSPADM

## 2024-10-29 RX ORDER — OMEPRAZOLE 40 MG/1
40 CAPSULE, DELAYED RELEASE ORAL 2 TIMES DAILY
Qty: 60 CAPSULE | Refills: 3 | Status: SHIPPED | OUTPATIENT
Start: 2024-10-29 | End: 2025-10-29

## 2024-10-29 RX ORDER — ACETAMINOPHEN 325 MG/1
650 TABLET ORAL EVERY 4 HOURS PRN
Status: DISCONTINUED | OUTPATIENT
Start: 2024-10-29 | End: 2024-10-30 | Stop reason: HOSPADM

## 2024-10-29 RX ORDER — ONDANSETRON HYDROCHLORIDE 2 MG/ML
8 INJECTION, SOLUTION INTRAVENOUS ONCE
Status: DISCONTINUED | OUTPATIENT
Start: 2024-10-29 | End: 2024-10-30 | Stop reason: HOSPADM

## 2024-10-29 RX ORDER — FENTANYL CITRATE 50 UG/ML
INJECTION, SOLUTION INTRAMUSCULAR; INTRAVENOUS AS NEEDED
Status: DISCONTINUED | OUTPATIENT
Start: 2024-10-29 | End: 2024-10-29

## 2024-10-29 RX ORDER — TRIAMCINOLONE ACETONIDE 40 MG/ML
40 INJECTION, SUSPENSION INTRA-ARTICULAR; INTRAMUSCULAR ONCE
Status: COMPLETED | OUTPATIENT
Start: 2024-10-29 | End: 2024-10-29

## 2024-10-29 RX ORDER — PROPOFOL 10 MG/ML
INJECTION, EMULSION INTRAVENOUS AS NEEDED
Status: DISCONTINUED | OUTPATIENT
Start: 2024-10-29 | End: 2024-10-29

## 2024-10-29 SDOH — HEALTH STABILITY: MENTAL HEALTH: CURRENT SMOKER: 1

## 2024-10-29 ASSESSMENT — PAIN SCALES - GENERAL
PAINLEVEL_OUTOF10: 0 - NO PAIN
PAINLEVEL_OUTOF10: 0 - NO PAIN
PAIN_LEVEL: 2
PAINLEVEL_OUTOF10: 0 - NO PAIN

## 2024-10-30 ENCOUNTER — TELEPHONE (OUTPATIENT)
Dept: ENDOCRINOLOGY | Facility: CLINIC | Age: 29
End: 2024-10-30
Payer: COMMERCIAL

## 2024-10-30 ENCOUNTER — TELEPHONE (OUTPATIENT)
Dept: GASTROENTEROLOGY | Facility: CLINIC | Age: 29
End: 2024-10-30
Payer: COMMERCIAL

## 2024-10-31 ENCOUNTER — TELEPHONE (OUTPATIENT)
Dept: ENDOCRINOLOGY | Facility: CLINIC | Age: 29
End: 2024-10-31
Payer: COMMERCIAL

## 2024-11-01 LAB
LABORATORY COMMENT REPORT: NORMAL
PATH REPORT.FINAL DX SPEC: NORMAL
PATH REPORT.GROSS SPEC: NORMAL
PATH REPORT.MICROSCOPIC SPEC OTHER STN: NORMAL
PATH REPORT.RELEVANT HX SPEC: NORMAL
PATH REPORT.TOTAL CANCER: NORMAL

## 2024-11-07 ENCOUNTER — APPOINTMENT (OUTPATIENT)
Dept: ENDOCRINOLOGY | Facility: CLINIC | Age: 29
End: 2024-11-07
Payer: COMMERCIAL

## 2025-01-02 ENCOUNTER — ANESTHESIA EVENT (OUTPATIENT)
Dept: OPERATING ROOM | Facility: HOSPITAL | Age: 30
End: 2025-01-02
Payer: COMMERCIAL

## 2025-01-06 ENCOUNTER — ANESTHESIA (OUTPATIENT)
Dept: OPERATING ROOM | Facility: HOSPITAL | Age: 30
End: 2025-01-06
Payer: COMMERCIAL

## 2025-01-06 ENCOUNTER — TELEPHONE (OUTPATIENT)
Dept: GASTROENTEROLOGY | Facility: CLINIC | Age: 30
End: 2025-01-06

## 2025-01-06 ENCOUNTER — HOSPITAL ENCOUNTER (OUTPATIENT)
Dept: OPERATING ROOM | Facility: HOSPITAL | Age: 30
Setting detail: OUTPATIENT SURGERY
Discharge: HOME | End: 2025-01-06
Payer: COMMERCIAL

## 2025-01-06 VITALS
RESPIRATION RATE: 16 BRPM | SYSTOLIC BLOOD PRESSURE: 100 MMHG | HEIGHT: 63 IN | WEIGHT: 171.96 LBS | OXYGEN SATURATION: 100 % | DIASTOLIC BLOOD PRESSURE: 57 MMHG | TEMPERATURE: 97.9 F | HEART RATE: 72 BPM | BODY MASS INDEX: 30.47 KG/M2

## 2025-01-06 DIAGNOSIS — K22.2 STRICTURE OF ESOPHAGUS: ICD-10-CM

## 2025-01-06 DIAGNOSIS — K20.0 EOSINOPHILIC ESOPHAGITIS: ICD-10-CM

## 2025-01-06 DIAGNOSIS — R13.19 ESOPHAGEAL DYSPHAGIA: ICD-10-CM

## 2025-01-06 DIAGNOSIS — R13.19 ESOPHAGEAL DYSPHAGIA: Primary | ICD-10-CM

## 2025-01-06 LAB — PREGNANCY TEST URINE, POC: NEGATIVE

## 2025-01-06 PROCEDURE — 3600000007 HC OR TIME - EACH INCREMENTAL 1 MINUTE - PROCEDURE LEVEL TWO: Performed by: STUDENT IN AN ORGANIZED HEALTH CARE EDUCATION/TRAINING PROGRAM

## 2025-01-06 PROCEDURE — 7100000009 HC PHASE TWO TIME - INITIAL BASE CHARGE: Performed by: STUDENT IN AN ORGANIZED HEALTH CARE EDUCATION/TRAINING PROGRAM

## 2025-01-06 PROCEDURE — 2500000004 HC RX 250 GENERAL PHARMACY W/ HCPCS (ALT 636 FOR OP/ED): Performed by: NURSE ANESTHETIST, CERTIFIED REGISTERED

## 2025-01-06 PROCEDURE — 3700000001 HC GENERAL ANESTHESIA TIME - INITIAL BASE CHARGE: Performed by: STUDENT IN AN ORGANIZED HEALTH CARE EDUCATION/TRAINING PROGRAM

## 2025-01-06 PROCEDURE — 7100000010 HC PHASE TWO TIME - EACH INCREMENTAL 1 MINUTE: Performed by: STUDENT IN AN ORGANIZED HEALTH CARE EDUCATION/TRAINING PROGRAM

## 2025-01-06 PROCEDURE — 3700000002 HC GENERAL ANESTHESIA TIME - EACH INCREMENTAL 1 MINUTE: Performed by: STUDENT IN AN ORGANIZED HEALTH CARE EDUCATION/TRAINING PROGRAM

## 2025-01-06 PROCEDURE — 81025 URINE PREGNANCY TEST: CPT | Performed by: INTERNAL MEDICINE

## 2025-01-06 PROCEDURE — C1726 CATH, BAL DIL, NON-VASCULAR: HCPCS | Performed by: STUDENT IN AN ORGANIZED HEALTH CARE EDUCATION/TRAINING PROGRAM

## 2025-01-06 PROCEDURE — 43235 EGD DIAGNOSTIC BRUSH WASH: CPT | Performed by: INTERNAL MEDICINE

## 2025-01-06 PROCEDURE — 7100000002 HC RECOVERY ROOM TIME - EACH INCREMENTAL 1 MINUTE: Performed by: STUDENT IN AN ORGANIZED HEALTH CARE EDUCATION/TRAINING PROGRAM

## 2025-01-06 PROCEDURE — 3600000002 HC OR TIME - INITIAL BASE CHARGE - PROCEDURE LEVEL TWO: Performed by: STUDENT IN AN ORGANIZED HEALTH CARE EDUCATION/TRAINING PROGRAM

## 2025-01-06 PROCEDURE — A43249 PR EDG BALLOON DILATION ESOPHAGUS <30 MM DIAM: Performed by: NURSE ANESTHETIST, CERTIFIED REGISTERED

## 2025-01-06 PROCEDURE — 2720000007 HC OR 272 NO HCPCS: Performed by: STUDENT IN AN ORGANIZED HEALTH CARE EDUCATION/TRAINING PROGRAM

## 2025-01-06 PROCEDURE — 7100000001 HC RECOVERY ROOM TIME - INITIAL BASE CHARGE: Performed by: STUDENT IN AN ORGANIZED HEALTH CARE EDUCATION/TRAINING PROGRAM

## 2025-01-06 PROCEDURE — A43249 PR EDG BALLOON DILATION ESOPHAGUS <30 MM DIAM: Performed by: STUDENT IN AN ORGANIZED HEALTH CARE EDUCATION/TRAINING PROGRAM

## 2025-01-06 RX ORDER — PROPOFOL 10 MG/ML
INJECTION, EMULSION INTRAVENOUS AS NEEDED
Status: DISCONTINUED | OUTPATIENT
Start: 2025-01-06 | End: 2025-01-06

## 2025-01-06 RX ORDER — LIDOCAINE HYDROCHLORIDE 10 MG/ML
INJECTION, SOLUTION EPIDURAL; INFILTRATION; INTRACAUDAL; PERINEURAL AS NEEDED
Status: DISCONTINUED | OUTPATIENT
Start: 2025-01-06 | End: 2025-01-06

## 2025-01-06 RX ORDER — MIDAZOLAM HYDROCHLORIDE 1 MG/ML
INJECTION INTRAMUSCULAR; INTRAVENOUS AS NEEDED
Status: DISCONTINUED | OUTPATIENT
Start: 2025-01-06 | End: 2025-01-06

## 2025-01-06 RX ORDER — ONDANSETRON HYDROCHLORIDE 2 MG/ML
4 INJECTION, SOLUTION INTRAVENOUS ONCE AS NEEDED
Status: DISCONTINUED | OUTPATIENT
Start: 2025-01-06 | End: 2025-01-07 | Stop reason: HOSPADM

## 2025-01-06 RX ORDER — FENTANYL CITRATE 50 UG/ML
INJECTION, SOLUTION INTRAMUSCULAR; INTRAVENOUS AS NEEDED
Status: DISCONTINUED | OUTPATIENT
Start: 2025-01-06 | End: 2025-01-06

## 2025-01-06 RX ORDER — DROPERIDOL 2.5 MG/ML
0.62 INJECTION, SOLUTION INTRAMUSCULAR; INTRAVENOUS ONCE AS NEEDED
Status: DISCONTINUED | OUTPATIENT
Start: 2025-01-06 | End: 2025-01-07 | Stop reason: HOSPADM

## 2025-01-06 RX ADMIN — PROPOFOL 160 MCG/KG/MIN: 10 INJECTION, EMULSION INTRAVENOUS at 08:33

## 2025-01-06 RX ADMIN — LIDOCAINE HYDROCHLORIDE 50 MG: 10 SOLUTION INTRAVENOUS at 08:31

## 2025-01-06 RX ADMIN — FENTANYL CITRATE 25 MCG: 50 INJECTION, SOLUTION INTRAMUSCULAR; INTRAVENOUS at 08:41

## 2025-01-06 RX ADMIN — FENTANYL CITRATE 25 MCG: 50 INJECTION, SOLUTION INTRAMUSCULAR; INTRAVENOUS at 08:33

## 2025-01-06 RX ADMIN — MIDAZOLAM HYDROCHLORIDE 1 MG: 1 INJECTION, SOLUTION INTRAMUSCULAR; INTRAVENOUS at 08:41

## 2025-01-06 RX ADMIN — FENTANYL CITRATE 50 MCG: 50 INJECTION, SOLUTION INTRAMUSCULAR; INTRAVENOUS at 08:25

## 2025-01-06 RX ADMIN — MIDAZOLAM HYDROCHLORIDE 1 MG: 1 INJECTION, SOLUTION INTRAMUSCULAR; INTRAVENOUS at 08:25

## 2025-01-06 RX ADMIN — PROPOFOL 50 MG: 10 INJECTION, EMULSION INTRAVENOUS at 08:31

## 2025-01-06 SDOH — HEALTH STABILITY: MENTAL HEALTH: CURRENT SMOKER: 0

## 2025-01-06 ASSESSMENT — PAIN - FUNCTIONAL ASSESSMENT: PAIN_FUNCTIONAL_ASSESSMENT: 0-10

## 2025-01-06 ASSESSMENT — PAIN SCALES - GENERAL
PAINLEVEL_OUTOF10: 0 - NO PAIN

## 2025-01-06 ASSESSMENT — ENCOUNTER SYMPTOMS: TROUBLE SWALLOWING: 1

## 2025-01-06 NOTE — ANESTHESIA PREPROCEDURE EVALUATION
Patient: Vonda Francis    Procedure Information       Date/Time: 01/06/25 0800    Scheduled providers: Joselito De La Cruz MD; Justin Sol MD    Procedure: EGD    Location: [facility] OR            Relevant Problems   GI   (+) GERD (gastroesophageal reflux disease)      Endocrine   (+) Hypothyroidism      Hematology   (+) Iron deficiency anemia      ID   (+) Other impetigo       Clinical information reviewed:   Tobacco  Allergies  Meds   Med Hx  Surg Hx   Fam Hx  Soc Hx        NPO Detail:  No data recorded     Physical Exam    Airway  Mallampati: II  TM distance: >3 FB  Neck ROM: full     Cardiovascular - normal exam     Dental - normal exam     Pulmonary - normal exam     Abdominal - normal exam  Abdomen: soft             Anesthesia Plan    History of general anesthesia?: yes  History of complications of general anesthesia?: no    ASA 2     MAC     The patient is not a current smoker.    intravenous induction   Anesthetic plan and risks discussed with patient.  Use of blood products discussed with patient who consented to blood products.    Plan discussed with CRNA.

## 2025-01-06 NOTE — ANESTHESIA POSTPROCEDURE EVALUATION
Patient: Vonda Francis    Procedure Summary       Date: 01/06/25 Room / Location: [facility] OR    Anesthesia Start: 0819 Anesthesia Stop: 0854    Procedure: EGD Diagnosis:       Esophageal dysphagia      Eosinophilic esophagitis    Scheduled Providers: Joselito De La Cruz MD; Justin Sol MD Responsible Provider: Justin Sol MD    Anesthesia Type: MAC ASA Status: 2            Anesthesia Type: MAC    Vitals Value Taken Time   BP 99/64 01/06/25 0915   Temp 36.6 °C (97.9 °F) 01/06/25 0850   Pulse 76 01/06/25 0915   Resp 15 01/06/25 0915   SpO2 100 % 01/06/25 0915       Anesthesia Post Evaluation    Patient location during evaluation: PACU  Patient participation: complete - patient participated  Level of consciousness: awake and alert  Pain management: adequate  Multimodal analgesia pain management approach  Airway patency: patent  Cardiovascular status: acceptable  Respiratory status: acceptable  Hydration status: acceptable  Postoperative Nausea and Vomiting: none        No notable events documented.

## 2025-01-06 NOTE — H&P
History Of Present Illness  Vonda Francis is a 29 y.o. female presenting to GI lab for EGD with dilation     Past Medical History  Past Medical History:   Diagnosis Date    Acid reflux     Acute hypoxic respiratory failure 10/09/2024    Aspiration into respiratory tract 10/09/2024    During dilation esophagus under sedation    Class 1 obesity with body mass index (BMI) of 30.0 to 30.9 in adult 10/18/2024    Encounter for gynecological examination (general) (routine) without abnormal findings 08/01/2016    Well woman exam with routine gynecological exam    Encounter for screening for infections with a predominantly sexual mode of transmission 08/01/2016    Screen for sexually transmitted diseases    Eosinophilic esophagitis 10/29/2024    Esophageal dysphagia 10/29/2024    Female infertility     Gastroesophageal reflux disease, unspecified whether esophagitis present     Gestational diabetes     Gestational hypertension ([facility])     History of chlamydia infection 09/12/2016    History of folliculitis 08/01/2016    History of paronychia of finger 07/16/2015    Hypothyroid     Iron deficiency anemia     Odynophagia     Other nail disorders 08/01/2016    Nail deformity    Plantar wart 08/01/2016    Plantar wart, left foot    [facility] delivery [facility] 02/15/2024       Surgical History  Past Surgical History:   Procedure Laterality Date    ESOPHAGEAL DILATION  10/09/2024    ESOPHAGOGASTRODUODENOSCOPY  10/09/2024    SALPINGECTOMY Bilateral         Social History  She reports that she has quit smoking. Her smoking use included cigarettes. She has been exposed to tobacco smoke. She has never used smokeless tobacco. She reports that she does not currently use alcohol. She reports that she does not use drugs.    Family History  Family History   Problem Relation Name Age of Onset    Bipolar disorder Mother      Other (CERVICAL CARCINOMA) Mother      Depression Mother      Asthma Mother      Diabetes Mother       "Hypertension Mother      Hyperlipidemia Mother      Asthma Father      Depression Sister      Asthma Sister      Diabetes Maternal Grandfather      Lung cancer Maternal Grandfather      Bone cancer Maternal Grandfather          Allergies  Penicillins    Review of Systems   HENT:  Positive for trouble swallowing.         Physical Exam  Cardiovascular:      Rate and Rhythm: Normal rate and regular rhythm.   Pulmonary:      Effort: Pulmonary effort is normal. No respiratory distress.   Neurological:      Mental Status: She is alert and oriented to person, place, and time.          Last Recorded Vitals  Blood pressure 127/70, pulse 85, temperature 36 °C (96.8 °F), resp. rate 16, height 1.6 m (5' 3\"), weight 78 kg (171 lb 15.3 oz), SpO2 100%, unknown if currently breastfeeding.    Relevant Results             Assessment/Plan   Assessment & Plan  Esophageal dysphagia    Eosinophilic esophagitis             EGD with dilation      Joselito De La Cruz MD    "

## 2025-01-06 NOTE — TELEPHONE ENCOUNTER
----- Message from Joselito De La Cruz sent at 1/6/2025 11:20 AM EST -----  Office visit with me.  Also please schedule repeat EGD in 2 months.

## 2025-01-06 NOTE — DISCHARGE INSTRUCTIONS

## 2025-02-03 ENCOUNTER — TELEPHONE (OUTPATIENT)
Dept: ENDOCRINOLOGY | Facility: CLINIC | Age: 30
End: 2025-02-03
Payer: COMMERCIAL

## 2025-02-03 NOTE — TELEPHONE ENCOUNTER
Returned patient call. Patient states partner is now able to access form and is completing it. Patient denies further questions/concerns.   MINI GARCIA on 2/3/25 at 3:18 PM.

## 2025-02-03 NOTE — TELEPHONE ENCOUNTER
Returned patient call regarding next steps for transfer cycle. Patient instructed she and partner will need to complete [employer] form sent via [employer]. Patient instructed she will need to re schedule cancelled MFM appointment. Rancho states she would like to do transfer with either march or April menses after EGD. Patient instructed to complete above items and call a week before expected menses to ensure BP items are complete and patient is cleared for treatment. Patient agreeable and denies further questions/concerns.   MINI GARCIA on 2/3/25 at 2:56 PM.

## 2025-02-03 NOTE — TELEPHONE ENCOUNTER
Caller:   Reason for call: questions regarding next steps  Notes: pt wants a call back for next steps

## 2025-02-03 NOTE — TELEPHONE ENCOUNTER
Reason for call:Patient spoke to nurse Razia calling her back about [employer] paperwork. Her  is not able to access it.  Notes:

## 2025-02-04 ENCOUNTER — TELEPHONE (OUTPATIENT)
Dept: ENDOCRINOLOGY | Facility: CLINIC | Age: 30
End: 2025-02-04
Payer: COMMERCIAL

## 2025-02-04 NOTE — TELEPHONE ENCOUNTER
Reason for call:   Notes: needs a appt for MFM, but MFM is booking out far. Wants to know if she can start IVF first

## 2025-02-04 NOTE — TELEPHONE ENCOUNTER
Attempted to return patient call regarding MFM. Detailed VM as well as my chart message sent to patient. Patient instructed MFM consult is needed prior to being cleared for a transfer. Patient instructed to call office back with questions/concerns.   MINI GARCIA on 2/4/25 at 3:06 PM.

## 2025-02-06 ENCOUNTER — TELEPHONE (OUTPATIENT)
Dept: ENDOCRINOLOGY | Facility: CLINIC | Age: 30
End: 2025-02-06
Payer: COMMERCIAL

## 2025-02-06 NOTE — TELEPHONE ENCOUNTER
Patient with questions regarding positive antibody results of T&S.  Discussed with patient that antibodies are often produced by the body after blood transfusion.   However the antibody can pose risk for future pregnancies which is why she is being referred to [facility] for a consult prior to any pregnancy attempts.   Patient states she is waiting for a call back to get scheduled.   Let her know [facility] are the providers who would be able to discuss the antibodies in depth and give her more information.   Still waiting on Dr. Phillip to get back to us on if this is required prior to pregnancy.  A nurse will reach out to patient once we have an answer.      Jeny Urban 02/06/25 11:14 AM

## 2025-02-07 PROBLEM — J96.01 ACUTE HYPOXIC RESPIRATORY FAILURE (MULTI): Status: RESOLVED | Noted: 2024-10-09 | Resolved: 2025-02-07

## 2025-02-07 PROBLEM — K20.0 EOSINOPHILIC ESOPHAGITIS: Status: ACTIVE | Noted: 2025-02-07

## 2025-02-07 PROBLEM — J69.0 ASPIRATION PNEUMONIA (MULTI): Status: RESOLVED | Noted: 2024-10-09 | Resolved: 2025-02-07

## 2025-02-07 NOTE — PROGRESS NOTES
HPI:  pt here to establish and for routine wellness exam w/o any issues.  Hasn't had a pcp and has been getting care from ob/gyn, urgent care,     PMH:   Past Medical History:   Diagnosis Date    Acid reflux     Acute hypoxic respiratory failure 10/09/2024    Aspiration into respiratory tract 10/09/2024    During dilation esophagus under sedation    Class 1 obesity with body mass index (BMI) of 30.0 to 30.9 in adult 10/18/2024    Eosinophilic esophagitis 10/29/2024    Esophageal dysphagia 10/29/2024    Female infertility     Gastroesophageal reflux disease, unspecified whether esophagitis present     Gestational diabetes     Gestational hypertension ([facility])     History of chlamydia infection 09/12/2016    History of folliculitis 08/01/2016    History of paronychia of finger 07/16/2015    Hypothyroid     Iron deficiency anemia     Odynophagia     Other nail disorders 08/01/2016    Nail deformity    Plantar wart 08/01/2016    Plantar wart, left foot    Vitamin D deficiency 10/13/2023     MEDICATIONS:   Current Outpatient Medications   Medication Sig Dispense Refill    acetaminophen (Tylenol) 160 mg/5 mL liquid Take 650 mg by mouth every 4 hours if needed for mild pain (1 - 3).      estradiol (Estrace) 2 mg tablet Take 3 tablets (6 mg) by mouth once daily. 90 tablet 2    omeprazole (PriLOSEC) 40 mg DR capsule Take 1 capsule (40 mg) by mouth 2 times a day. Do not crush or chew. 60 capsule 3     No current facility-administered medications for this visit.     ALLERGIES:    Allergies   Allergen Reactions    Penicillins Hives and Unknown     SOCIAL HX:    Social History     Tobacco Use    Smoking status: Former     Types: Cigarettes     Passive exposure: Past    Smokeless tobacco: Never   Vaping Use    Vaping status: Never Used   Substance Use Topics    Alcohol use: Not Currently    Drug use: Never     FAMILY HX:   Family History   Problem Relation Name Age of Onset    Bipolar disorder Mother      Other (CERVICAL  CARCINOMA) Mother      Depression Mother      Asthma Mother      Diabetes Mother      Hypertension Mother      Hyperlipidemia Mother      Asthma Father      Depression Sister      Asthma Sister      Diabetes Maternal Grandfather      Lung cancer Maternal Grandfather      Bone cancer Maternal Grandfather         ROS:             CONSTITUTIONAL:          Negative for concerns, fever, chills.         HEENT:          no Difficulty swallowing.  no Hearing loss.  no Poor sense of smell.   No change in vision ; no headaches     CARDIOLOGY:          Chest pain none.  Palpitations none.  Shortness of breath none.         RESPIRATORY:          Negative for persistent cough , wheezing, trouble breathing.         GASTROENTEROLOGY:          Negative for abdominal pain, change in bowel habits.         ENDOCRINOLOGY:          Negative for fatigue, polydipsia, polyuria, weight loss, weight gain, cold intolerance, heat intolerance.         MUSCULOSKELETAL:          Negative for myalgias, joint pain.         DERMATOLOGY:          Negative for rash, bruising, moles.         NEUROLOGY:          Negative for weakness, headache, dizziness.     VITALS: There were no vitals taken for this visit.     PE:  {PHYSICAL EXAM WITH PROVIDER CHOICES:57579}    Diagnoses and all orders for this visit:  Well adult exam (Primary)

## 2025-02-20 DIAGNOSIS — O36.0990: Primary | ICD-10-CM

## 2025-03-03 ENCOUNTER — ANESTHESIA EVENT (OUTPATIENT)
Dept: OPERATING ROOM | Facility: HOSPITAL | Age: 30
End: 2025-03-03
Payer: COMMERCIAL

## 2025-03-03 RX ORDER — ONDANSETRON HYDROCHLORIDE 2 MG/ML
4 INJECTION, SOLUTION INTRAVENOUS ONCE AS NEEDED
Status: CANCELLED | OUTPATIENT
Start: 2025-03-03

## 2025-03-03 RX ORDER — DROPERIDOL 2.5 MG/ML
0.62 INJECTION, SOLUTION INTRAMUSCULAR; INTRAVENOUS ONCE AS NEEDED
Status: CANCELLED | OUTPATIENT
Start: 2025-03-03

## 2025-03-04 ENCOUNTER — ANESTHESIA (OUTPATIENT)
Dept: OPERATING ROOM | Facility: HOSPITAL | Age: 30
End: 2025-03-04
Payer: COMMERCIAL

## 2025-03-04 ENCOUNTER — HOSPITAL ENCOUNTER (OUTPATIENT)
Dept: OPERATING ROOM | Facility: HOSPITAL | Age: 30
Setting detail: OUTPATIENT SURGERY
Discharge: HOME | End: 2025-03-04
Payer: COMMERCIAL

## 2025-03-04 ENCOUNTER — SPECIALTY PHARMACY (OUTPATIENT)
Dept: PHARMACY | Facility: CLINIC | Age: 30
End: 2025-03-04

## 2025-03-04 VITALS
RESPIRATION RATE: 16 BRPM | SYSTOLIC BLOOD PRESSURE: 101 MMHG | TEMPERATURE: 98.6 F | HEIGHT: 63 IN | OXYGEN SATURATION: 100 % | BODY MASS INDEX: 29.69 KG/M2 | DIASTOLIC BLOOD PRESSURE: 53 MMHG | HEART RATE: 90 BPM | WEIGHT: 167.55 LBS

## 2025-03-04 DIAGNOSIS — K20.0 EOSINOPHILIC ESOPHAGITIS: Primary | ICD-10-CM

## 2025-03-04 DIAGNOSIS — R13.19 ESOPHAGEAL DYSPHAGIA: ICD-10-CM

## 2025-03-04 DIAGNOSIS — K22.2 STRICTURE OF ESOPHAGUS: ICD-10-CM

## 2025-03-04 LAB — PREGNANCY TEST URINE, POC: NEGATIVE

## 2025-03-04 PROCEDURE — 96372 THER/PROPH/DIAG INJ SC/IM: CPT | Performed by: INTERNAL MEDICINE

## 2025-03-04 PROCEDURE — 43249 ESOPH EGD DILATION <30 MM: CPT | Performed by: INTERNAL MEDICINE

## 2025-03-04 PROCEDURE — 2500000004 HC RX 250 GENERAL PHARMACY W/ HCPCS (ALT 636 FOR OP/ED): Performed by: INTERNAL MEDICINE

## 2025-03-04 PROCEDURE — 7100000009 HC PHASE TWO TIME - INITIAL BASE CHARGE: Performed by: ANESTHESIOLOGY

## 2025-03-04 PROCEDURE — 3700000002 HC GENERAL ANESTHESIA TIME - EACH INCREMENTAL 1 MINUTE: Performed by: ANESTHESIOLOGY

## 2025-03-04 PROCEDURE — 3700000001 HC GENERAL ANESTHESIA TIME - INITIAL BASE CHARGE: Performed by: ANESTHESIOLOGY

## 2025-03-04 PROCEDURE — 2500000005 HC RX 250 GENERAL PHARMACY W/O HCPCS: Performed by: NURSE ANESTHETIST, CERTIFIED REGISTERED

## 2025-03-04 PROCEDURE — C1726 CATH, BAL DIL, NON-VASCULAR: HCPCS | Performed by: ANESTHESIOLOGY

## 2025-03-04 PROCEDURE — A43249 PR EDG BALLOON DILATION ESOPHAGUS <30 MM DIAM: Performed by: NURSE ANESTHETIST, CERTIFIED REGISTERED

## 2025-03-04 PROCEDURE — 7100000010 HC PHASE TWO TIME - EACH INCREMENTAL 1 MINUTE: Performed by: ANESTHESIOLOGY

## 2025-03-04 PROCEDURE — A43249 PR EDG BALLOON DILATION ESOPHAGUS <30 MM DIAM: Performed by: ANESTHESIOLOGY

## 2025-03-04 PROCEDURE — 2500000004 HC RX 250 GENERAL PHARMACY W/ HCPCS (ALT 636 FOR OP/ED): Performed by: ANESTHESIOLOGY

## 2025-03-04 PROCEDURE — 2500000004 HC RX 250 GENERAL PHARMACY W/ HCPCS (ALT 636 FOR OP/ED): Performed by: NURSE ANESTHETIST, CERTIFIED REGISTERED

## 2025-03-04 PROCEDURE — 3600000007 HC OR TIME - EACH INCREMENTAL 1 MINUTE - PROCEDURE LEVEL TWO: Performed by: ANESTHESIOLOGY

## 2025-03-04 PROCEDURE — 3600000002 HC OR TIME - INITIAL BASE CHARGE - PROCEDURE LEVEL TWO: Performed by: ANESTHESIOLOGY

## 2025-03-04 PROCEDURE — 2720000007 HC OR 272 NO HCPCS: Performed by: ANESTHESIOLOGY

## 2025-03-04 RX ORDER — LIDOCAINE HYDROCHLORIDE 20 MG/ML
SOLUTION OROPHARYNGEAL AS NEEDED
Status: DISCONTINUED | OUTPATIENT
Start: 2025-03-04 | End: 2025-03-04

## 2025-03-04 RX ORDER — FENTANYL CITRATE 50 UG/ML
INJECTION, SOLUTION INTRAMUSCULAR; INTRAVENOUS AS NEEDED
Status: DISCONTINUED | OUTPATIENT
Start: 2025-03-04 | End: 2025-03-04

## 2025-03-04 RX ORDER — FLUTICASONE PROPIONATE 220 UG/1
2 AEROSOL, METERED RESPIRATORY (INHALATION)
Qty: 12 G | Refills: 2 | Status: SHIPPED | OUTPATIENT
Start: 2025-03-04 | End: 2026-03-04

## 2025-03-04 RX ORDER — TRIAMCINOLONE ACETONIDE 40 MG/ML
40 INJECTION, SUSPENSION INTRA-ARTICULAR; INTRAMUSCULAR ONCE
Status: COMPLETED | OUTPATIENT
Start: 2025-03-04 | End: 2025-03-04

## 2025-03-04 RX ORDER — SODIUM CHLORIDE, SODIUM LACTATE, POTASSIUM CHLORIDE, CALCIUM CHLORIDE 600; 310; 30; 20 MG/100ML; MG/100ML; MG/100ML; MG/100ML
20 INJECTION, SOLUTION INTRAVENOUS CONTINUOUS
Status: DISCONTINUED | OUTPATIENT
Start: 2025-03-04 | End: 2025-03-05 | Stop reason: HOSPADM

## 2025-03-04 RX ORDER — ONDANSETRON HYDROCHLORIDE 2 MG/ML
INJECTION, SOLUTION INTRAVENOUS AS NEEDED
Status: DISCONTINUED | OUTPATIENT
Start: 2025-03-04 | End: 2025-03-04

## 2025-03-04 RX ORDER — PROPOFOL 10 MG/ML
INJECTION, EMULSION INTRAVENOUS AS NEEDED
Status: DISCONTINUED | OUTPATIENT
Start: 2025-03-04 | End: 2025-03-04

## 2025-03-04 RX ADMIN — LIDOCAINE HYDROCHLORIDE 40 MG: 20 SOLUTION ORAL at 09:06

## 2025-03-04 RX ADMIN — TRIAMCINOLONE ACETONIDE 40 MG: 40 INJECTION, SUSPENSION INTRA-ARTICULAR; INTRAMUSCULAR at 09:13

## 2025-03-04 RX ADMIN — PROPOFOL 40 MG: 10 INJECTION, EMULSION INTRAVENOUS at 09:06

## 2025-03-04 RX ADMIN — SODIUM CHLORIDE, POTASSIUM CHLORIDE, SODIUM LACTATE AND CALCIUM CHLORIDE 20 ML/HR: 600; 310; 30; 20 INJECTION, SOLUTION INTRAVENOUS at 06:55

## 2025-03-04 RX ADMIN — PROPOFOL 20 MG: 10 INJECTION, EMULSION INTRAVENOUS at 09:09

## 2025-03-04 RX ADMIN — ONDANSETRON 4 MG: 2 INJECTION, SOLUTION INTRAMUSCULAR; INTRAVENOUS at 09:15

## 2025-03-04 RX ADMIN — PROPOFOL 140 MCG/KG/MIN: 10 INJECTION, EMULSION INTRAVENOUS at 09:07

## 2025-03-04 RX ADMIN — FENTANYL CITRATE 25 MCG: 50 INJECTION, SOLUTION INTRAMUSCULAR; INTRAVENOUS at 09:09

## 2025-03-04 RX ADMIN — FENTANYL CITRATE 50 MCG: 50 INJECTION, SOLUTION INTRAMUSCULAR; INTRAVENOUS at 09:06

## 2025-03-04 SDOH — HEALTH STABILITY: MENTAL HEALTH: CURRENT SMOKER: 0

## 2025-03-04 ASSESSMENT — PAIN SCALES - GENERAL
PAIN_LEVEL: 0
PAINLEVEL_OUTOF10: 0 - NO PAIN
PAINLEVEL_OUTOF10: 0 - NO PAIN

## 2025-03-04 ASSESSMENT — PAIN - FUNCTIONAL ASSESSMENT
PAIN_FUNCTIONAL_ASSESSMENT: 0-10
PAIN_FUNCTIONAL_ASSESSMENT: 0-10

## 2025-03-04 ASSESSMENT — ENCOUNTER SYMPTOMS: TROUBLE SWALLOWING: 1

## 2025-03-04 NOTE — DISCHARGE INSTRUCTIONS

## 2025-03-04 NOTE — ANESTHESIA POSTPROCEDURE EVALUATION
Patient: Vonda Francis    Procedure Summary       Date: 03/04/25 Room / Location: [facility] OR    Anesthesia Start: 0853 Anesthesia Stop: 0930    Procedure: EGD Diagnosis:       Esophageal dysphagia      Stricture of esophagus    Scheduled Providers: Joselito De La Cruz MD; Noe Becerra MD Responsible Provider: Noe Becerra MD    Anesthesia Type: MAC ASA Status: 2            Anesthesia Type: MAC    Vitals Value Taken Time   /57 03/04/25 0930   Temp 37.0 03/04/25 0930   Pulse 91 03/04/25 0930   Resp 16 03/04/25 0930   SpO2 99 03/04/25 0930       Anesthesia Post Evaluation    Patient location during evaluation: PACU  Patient participation: complete - patient participated  Level of consciousness: awake and alert  Pain score: 0  Pain management: adequate  Airway patency: patent  Two or more strategies used to mitigate risk of obstructive sleep apnea  Cardiovascular status: acceptable and stable  Respiratory status: acceptable and room air  Hydration status: acceptable  Postoperative Nausea and Vomiting: none        There were no known notable events for this encounter.

## 2025-03-04 NOTE — ANESTHESIA PREPROCEDURE EVALUATION
Patient: Vonda Francis    Procedure Information       Date/Time: 03/04/25 0800    Scheduled providers: Joselito De La Cruz MD; Noe Becerra MD    Procedure: EGD    Location: [facility] OR          Vitals:    03/04/25 0613   BP: 110/62   Pulse: 75   Resp: 16   Temp: 36.3 °C (97.3 °F)   SpO2: 97%       Past Surgical History:   Procedure Laterality Date    ESOPHAGEAL DILATION  10/09/2024    ESOPHAGOGASTRODUODENOSCOPY  10/09/2024    SALPINGECTOMY Bilateral      Past Medical History:   Diagnosis Date    Acid reflux     Acute hypoxic respiratory failure 10/09/2024    Aspiration into respiratory tract 10/09/2024    During dilation esophagus under sedation    Class 1 obesity with body mass index (BMI) of 30.0 to 30.9 in adult 10/18/2024    Eosinophilic esophagitis 10/29/2024    GI    Esophageal dysphagia 10/29/2024    Female infertility     Gastroesophageal reflux disease, unspecified whether esophagitis present     Gestational diabetes     Gestational hypertension ([facility])     History of chlamydia infection 09/12/2016    History of folliculitis 08/01/2016    History of paronychia of finger 07/16/2015    Hypothyroid     Iron deficiency anemia     Odynophagia     Other nail disorders 08/01/2016    Nail deformity    Plantar wart 08/01/2016    Plantar wart, left foot    Vitamin D deficiency 10/13/2023       Current Outpatient Medications:     acetaminophen (Tylenol) 160 mg/5 mL liquid, Take 650 mg by mouth every 4 hours if needed for mild pain (1 - 3)., Disp: , Rfl:     omeprazole (PriLOSEC) 40 mg DR capsule, Take 1 capsule (40 mg) by mouth 2 times a day. Do not crush or chew., Disp: 60 capsule, Rfl: 3    estradiol (Estrace) 2 mg tablet, Take 3 tablets (6 mg) by mouth once daily., Disp: 90 tablet, Rfl: 2    Current Facility-Administered Medications:     lactated Ringer's infusion, 20 mL/hr, intravenous, Continuous, Noe Becerra MD, Last Rate: 20 mL/hr at 03/04/25 0655, 20 mL/hr at 03/04/25  "0655    triamcinolone acetonide (Kenalog-40) injection 40 mg, 40 mg, intramuscular, Once, Joselito De La Cruz MD  Prior to Admission medications    Medication Sig Start Date End Date Taking? Authorizing Provider   acetaminophen (Tylenol) 160 mg/5 mL liquid Take 650 mg by mouth every 4 hours if needed for mild pain (1 - 3).   Yes Historical Provider, MD   omeprazole (PriLOSEC) 40 mg DR capsule Take 1 capsule (40 mg) by mouth 2 times a day. Do not crush or chew. 10/29/24 10/29/25 Yes Joselito De La Cruz MD   estradiol (Estrace) 2 mg tablet Take 3 tablets (6 mg) by mouth once daily. 10/25/24 10/25/25  Lashawn Lake MD     Allergies   Allergen Reactions    Penicillins Hives and Unknown     Social History     Tobacco Use    Smoking status: Former     Types: Cigarettes     Passive exposure: Past    Smokeless tobacco: Never   Substance Use Topics    Alcohol use: Not Currently         Chemistry    Lab Results   Component Value Date/Time     10/10/2024 0535    K 4.2 10/10/2024 0535     10/10/2024 0535    CO2 24 10/10/2024 0535    BUN 9 10/10/2024 0535    CREATININE 0.55 10/10/2024 0535    Lab Results   Component Value Date/Time    CALCIUM 8.8 10/10/2024 0535    ALKPHOS 83 02/12/2021 1330    AST 19 08/03/2023 1236    ALT 36 08/03/2023 1236    BILITOT 0.8 02/12/2021 1330          Lab Results   Component Value Date/Time    WBC 9.0 10/10/2024 0535    HGB 8.7 (L) 10/10/2024 0535    HCT 31.6 (L) 10/10/2024 0535     10/10/2024 0535     No results found for: \"PROTIME\", \"PTT\", \"INR\"  No results found. However, due to the size of the patient record, not all encounters were searched. Please check Results Review for a complete set of results.  No results found for this or any previous visit from the past 1095 days.      Relevant Problems   GI   (+) GERD (gastroesophageal reflux disease)      Endocrine   (+) Hypothyroidism      Hematology   (+) Iron deficiency anemia      ID   (+) Other impetigo       Clinical " information reviewed:   Tobacco  Allergies  Meds   Med Hx  Surg Hx  OB Status  Fam Hx  Soc   Hx        NPO Detail:  NPO/Void Status  Date of Last Liquid: 03/03/25  Time of Last Liquid: 2100  Date of Last Solid: 03/03/25  Time of Last Solid: 2100  Last Intake Type: Clear fluids; Food  Time of Last Void: 0645         Physical Exam    Airway  Mallampati: II     Cardiovascular - normal exam     Dental    Pulmonary    Abdominal            Anesthesia Plan    History of general anesthesia?: yes  History of complications of general anesthesia?: no    ASA 2     MAC     The patient is not a current smoker.  Patient was not previously instructed to abstain from smoking on day of procedure.  Patient did not smoke on day of procedure.  Education provided regarding risk of obstructive sleep apnea.  intravenous induction   Anesthetic plan and risks discussed with patient.    Plan discussed with CRNA.

## 2025-03-04 NOTE — H&P
History Of Present Illness  Vonda Francis is a 29 y.o. female presenting to GI lab for EGD with dilation     Past Medical History  Past Medical History:   Diagnosis Date    Acid reflux     Acute hypoxic respiratory failure 10/09/2024    Aspiration into respiratory tract 10/09/2024    During dilation esophagus under sedation    Class 1 obesity with body mass index (BMI) of 30.0 to 30.9 in adult 10/18/2024    Eosinophilic esophagitis 10/29/2024    GI    Esophageal dysphagia 10/29/2024    Female infertility     Gastroesophageal reflux disease, unspecified whether esophagitis present     Gestational diabetes     Gestational hypertension ([facility])     History of chlamydia infection 09/12/2016    History of folliculitis 08/01/2016    History of paronychia of finger 07/16/2015    Hypothyroid     Iron deficiency anemia     Odynophagia     Other nail disorders 08/01/2016    Nail deformity    Plantar wart 08/01/2016    Plantar wart, left foot    Vitamin D deficiency 10/13/2023       Surgical History  Past Surgical History:   Procedure Laterality Date    ESOPHAGEAL DILATION  10/09/2024    ESOPHAGOGASTRODUODENOSCOPY  10/09/2024    SALPINGECTOMY Bilateral         Social History  She reports that she has quit smoking. Her smoking use included cigarettes. She has been exposed to tobacco smoke. She has never used smokeless tobacco. She reports that she does not currently use alcohol. She reports that she does not use drugs.    Family History  Family History   Problem Relation Name Age of Onset    Bipolar disorder Mother      Other (CERVICAL CARCINOMA) Mother      Depression Mother      Asthma Mother      Diabetes Mother      Hypertension Mother      Hyperlipidemia Mother      Asthma Father      Depression Sister      Asthma Sister      Diabetes Maternal Grandfather      Lung cancer Maternal Grandfather      Bone cancer Maternal Grandfather          Allergies  Penicillins    Review of Systems   HENT:  Positive for trouble  "swallowing.         Physical Exam  Cardiovascular:      Rate and Rhythm: Normal rate and regular rhythm.   Pulmonary:      Effort: Pulmonary effort is normal. No respiratory distress.   Neurological:      Mental Status: She is alert and oriented to person, place, and time.          Last Recorded Vitals  Blood pressure 110/62, pulse 75, temperature 36.3 °C (97.3 °F), resp. rate 16, height 1.6 m (5' 3\"), weight 76 kg (167 lb 8.8 oz), SpO2 97%, unknown if currently breastfeeding.    Relevant Results             Assessment/Plan   Assessment & Plan  Esophageal dysphagia    Stricture of esophagus             EGD with dilation      Joselito De La Cruz MD    "

## 2025-03-06 ENCOUNTER — APPOINTMENT (OUTPATIENT)
Dept: PRIMARY CARE | Facility: CLINIC | Age: 30
End: 2025-03-06
Payer: COMMERCIAL

## 2025-03-06 DIAGNOSIS — Z00.00 WELL ADULT EXAM: Primary | ICD-10-CM

## 2025-03-06 NOTE — PROGRESS NOTES
HPI:  pt here to establish and for routine wellness exam w/o any issues.  Hasn't had a pcp and has been getting care from ob/gyn, urgent care,  she is going through [facility] for possible pregnancy again    PMH:   Past Medical History:   Diagnosis Date    Acid reflux     Acute hypoxic respiratory failure 10/09/2024    Aspiration into respiratory tract 10/09/2024    During dilation esophagus under sedation    Class 1 obesity with body mass index (BMI) of 30.0 to 30.9 in adult 10/18/2024    Eosinophilic esophagitis 10/29/2024    GI    Esophageal dysphagia 10/29/2024    Female infertility     Gastroesophageal reflux disease, unspecified whether esophagitis present     Gestational diabetes     Gestational hypertension ([facility])     History of chlamydia infection 09/12/2016    History of folliculitis 08/01/2016    History of paronychia of finger 07/16/2015    Hypothyroid     Iron deficiency anemia     Odynophagia     Other nail disorders 08/01/2016    Nail deformity    Plantar wart 08/01/2016    Plantar wart, left foot    Vitamin D deficiency 10/13/2023     MEDICATIONS:   Current Outpatient Medications   Medication Sig Dispense Refill    acetaminophen (Tylenol) 160 mg/5 mL liquid Take 650 mg by mouth every 4 hours if needed for mild pain (1 - 3).      budesonide (Pulmicort Flexhaler) 180 mcg/actuation inhaler Inhale 1 puff 2 times a day. Spray on mouth then  swallow. rinse mouth with water after use to reduce aftertaste and incidence of candidiasis. 60 each 1    estradiol (Estrace) 2 mg tablet Take 3 tablets (6 mg) by mouth once daily. 90 tablet 2    omeprazole (PriLOSEC) 40 mg DR capsule Take 1 capsule (40 mg) by mouth 2 times a day. Do not crush or chew. 60 capsule 3     No current facility-administered medications for this visit.     ALLERGIES:    Allergies   Allergen Reactions    Penicillins Hives and Unknown     SOCIAL HX:    Social History     Tobacco Use    Smoking status: Former     Types: Cigarettes     Passive  "exposure: Past    Smokeless tobacco: Never   Vaping Use    Vaping status: Never Used   Substance Use Topics    Alcohol use: Not Currently    Drug use: Never     FAMILY HX:   Family History   Problem Relation Name Age of Onset    Bipolar disorder Mother 40's     Other (CERVICAL CARCINOMA) Mother 40's     Depression Mother 40's     Asthma Mother 40's     Diabetes Mother 40's     Hypertension Mother 40's     Hyperlipidemia Mother 40's     Asthma Father      Depression Sister      Asthma Sister      Diabetes Maternal Grandfather      Lung cancer Maternal Grandfather      Bone cancer Maternal Grandfather         ROS:             CONSTITUTIONAL:          Negative for concerns, fever, chills.         HEENT:          no Difficulty swallowing.  no Hearing loss.  no Poor sense of smell.   No change in vision ; no headaches     CARDIOLOGY:          Chest pain none.  Palpitations none.  Shortness of breath none.         RESPIRATORY:          Negative for persistent cough , wheezing, trouble breathing.         GASTROENTEROLOGY:          Negative for abdominal pain, change in bowel habits.         ENDOCRINOLOGY:          Negative for fatigue, polydipsia, polyuria, weight loss, weight gain, cold intolerance, heat intolerance.         MUSCULOSKELETAL:          Negative for myalgias, joint pain.         DERMATOLOGY:          Negative for rash, bruising, moles.         NEUROLOGY:          Negative for weakness, headache, dizziness.     VITALS: /66   Pulse 70   Ht 1.614 m (5' 3.55\")   Wt 71.5 kg (157 lb 9.6 oz)   SpO2 99%   Breastfeeding No   BMI 27.44 kg/m²      PE:  General Appearance: awake, alert, oriented, in no acute distress  Skin: there are no suspicious lesions or rashes of concern  Head/Face: NCAT  Eyes: No gross abnormalities., PERRL, and EOMI  Ears: canals and TMs NI  Mouth/Throat: Mucosa moist, no lesions; pharynx without erythema, edema or exudate.  Neck: neck- supple, no mass, non-tender  Lungs: Normal " expansion.  Clear to auscultation.  No rales, rhonchi, or wheezing.  Heart: Heart sounds are normal.  Regular rate and rhythm without murmur, gallop or rub.  Abdomen: Soft, non-tender, normal bowel sounds; no bruits, organomegaly or masses.  Extremities: Extremities warm to touch, pink, with no edema.  Musculoskeletal: Range of motion normal in hips, knees, shoulders, and spine., No joint swelling, deformity, or tenderness.  Neurologic: Alert and oriented x 3, gait normal., reflexes normal and symmetric, strength and  sensation grossly normal    Vonda was seen today for establish care.  Diagnoses and all orders for this visit:  Well adult exam (Primary)  -     Comprehensive Metabolic Panel; Future  -     Comprehensive Metabolic Panel  Iron deficiency anemia secondary to inadequate dietary iron intake  -     CBC; Future  -     Iron and TIBC; Future  -     Ferritin; Future  -     CBC  -     Iron and TIBC  -     Ferritin  Vitamin D deficiency  -     Vitamin D 25-Hydroxy,Total (for eval of Vitamin D levels); Future  -     Vitamin D 25-Hydroxy,Total (for eval of Vitamin D levels)  History of gestational diabetes  -     Hemoglobin A1C; Future  -     Hemoglobin A1C  History of hypothyroidism  -     TSH with reflex to Free T4 if abnormal; Future  -     TSH with reflex to Free T4 if abnormal

## 2025-03-10 ENCOUNTER — LAB (OUTPATIENT)
Dept: LAB | Facility: HOSPITAL | Age: 30
End: 2025-03-10
Payer: COMMERCIAL

## 2025-03-10 PROCEDURE — 86850 RBC ANTIBODY SCREEN: CPT

## 2025-03-10 PROCEDURE — 86901 BLOOD TYPING SEROLOGIC RH(D): CPT

## 2025-03-10 PROCEDURE — 86900 BLOOD TYPING SEROLOGIC ABO: CPT

## 2025-03-11 ENCOUNTER — LAB REQUISITION (OUTPATIENT)
Dept: LAB | Facility: HOSPITAL | Age: 30
End: 2025-03-11
Payer: COMMERCIAL

## 2025-03-11 DIAGNOSIS — Z01.83 ENCOUNTER FOR RH BLOOD TYPING: Primary | ICD-10-CM

## 2025-03-11 DIAGNOSIS — O36.0990: ICD-10-CM

## 2025-03-11 LAB
ABO GROUP (TYPE) IN BLOOD: NORMAL
ANTIBODY SCREEN: NORMAL
RH FACTOR (ANTIGEN D): NORMAL

## 2025-03-19 DIAGNOSIS — K20.0 EOSINOPHILIC ESOPHAGITIS: Primary | ICD-10-CM

## 2025-03-19 RX ORDER — BUDESONIDE 180 UG/1
1 AEROSOL, POWDER RESPIRATORY (INHALATION)
Qty: 60 EACH | Refills: 1 | Status: SHIPPED | OUTPATIENT
Start: 2025-03-19 | End: 2026-03-19

## 2025-03-24 ENCOUNTER — OFFICE VISIT (OUTPATIENT)
Dept: PRIMARY CARE | Facility: CLINIC | Age: 30
End: 2025-03-24
Payer: COMMERCIAL

## 2025-03-24 VITALS
OXYGEN SATURATION: 99 % | WEIGHT: 157.6 LBS | HEART RATE: 70 BPM | BODY MASS INDEX: 26.91 KG/M2 | SYSTOLIC BLOOD PRESSURE: 108 MMHG | HEIGHT: 64 IN | DIASTOLIC BLOOD PRESSURE: 66 MMHG

## 2025-03-24 DIAGNOSIS — D50.8 IRON DEFICIENCY ANEMIA SECONDARY TO INADEQUATE DIETARY IRON INTAKE: ICD-10-CM

## 2025-03-24 DIAGNOSIS — Z86.39 HISTORY OF HYPOTHYROIDISM: ICD-10-CM

## 2025-03-24 DIAGNOSIS — E55.9 VITAMIN D DEFICIENCY: ICD-10-CM

## 2025-03-24 DIAGNOSIS — Z86.32 HISTORY OF GESTATIONAL DIABETES: ICD-10-CM

## 2025-03-24 DIAGNOSIS — Z00.00 WELL ADULT EXAM: Primary | ICD-10-CM

## 2025-03-24 PROBLEM — E03.9 HYPOTHYROIDISM: Status: RESOLVED | Noted: 2024-02-15 | Resolved: 2025-03-24

## 2025-03-24 PROCEDURE — 1036F TOBACCO NON-USER: CPT | Performed by: FAMILY MEDICINE

## 2025-03-24 PROCEDURE — 99385 PREV VISIT NEW AGE 18-39: CPT | Performed by: FAMILY MEDICINE

## 2025-03-24 PROCEDURE — 3008F BODY MASS INDEX DOCD: CPT | Performed by: FAMILY MEDICINE

## 2025-03-24 ASSESSMENT — PATIENT HEALTH QUESTIONNAIRE - PHQ9
1. LITTLE INTEREST OR PLEASURE IN DOING THINGS: NOT AT ALL
2. FEELING DOWN, DEPRESSED OR HOPELESS: NOT AT ALL
SUM OF ALL RESPONSES TO PHQ9 QUESTIONS 1 AND 2: 0

## 2025-03-24 ASSESSMENT — PAIN SCALES - GENERAL: PAINLEVEL_OUTOF10: 0-NO PAIN

## 2025-03-25 DIAGNOSIS — D50.8 IRON DEFICIENCY ANEMIA SECONDARY TO INADEQUATE DIETARY IRON INTAKE: Primary | ICD-10-CM

## 2025-03-25 PROBLEM — E55.9 VITAMIN D DEFICIENCY: Status: RESOLVED | Noted: 2023-10-13 | Resolved: 2025-03-25

## 2025-03-25 LAB
25(OH)D3+25(OH)D2 SERPL-MCNC: 30 NG/ML (ref 30–100)
ALBUMIN SERPL-MCNC: 4.2 G/DL (ref 3.6–5.1)
ALP SERPL-CCNC: 72 U/L (ref 31–125)
ALT SERPL-CCNC: 38 U/L (ref 6–29)
ANION GAP SERPL CALCULATED.4IONS-SCNC: 7 MMOL/L (CALC) (ref 7–17)
AST SERPL-CCNC: 16 U/L (ref 10–30)
BILIRUB SERPL-MCNC: 0.5 MG/DL (ref 0.2–1.2)
BUN SERPL-MCNC: 9 MG/DL (ref 7–25)
CALCIUM SERPL-MCNC: 8.9 MG/DL (ref 8.6–10.2)
CHLORIDE SERPL-SCNC: 107 MMOL/L (ref 98–110)
CO2 SERPL-SCNC: 26 MMOL/L (ref 20–32)
CREAT SERPL-MCNC: 0.56 MG/DL (ref 0.5–0.96)
EGFRCR SERPLBLD CKD-EPI 2021: 127 ML/MIN/1.73M2
ERYTHROCYTE [DISTWIDTH] IN BLOOD BY AUTOMATED COUNT: 18.1 % (ref 11–15)
EST. AVERAGE GLUCOSE BLD GHB EST-MCNC: 111 MG/DL
EST. AVERAGE GLUCOSE BLD GHB EST-SCNC: 6.2 MMOL/L
FERRITIN SERPL-MCNC: 13 NG/ML (ref 16–154)
GLUCOSE SERPL-MCNC: 97 MG/DL (ref 65–99)
HBA1C MFR BLD: 5.5 % OF TOTAL HGB
HCT VFR BLD AUTO: 34.3 % (ref 35–45)
HGB BLD-MCNC: 9.6 G/DL (ref 11.7–15.5)
IRON SATN MFR SERPL: 5 % (CALC) (ref 16–45)
IRON SERPL-MCNC: 18 MCG/DL (ref 40–190)
MCH RBC QN AUTO: 19.1 PG (ref 27–33)
MCHC RBC AUTO-ENTMCNC: 28 G/DL (ref 32–36)
MCV RBC AUTO: 68.2 FL (ref 80–100)
PLATELET # BLD AUTO: 278 THOUSAND/UL (ref 140–400)
PMV BLD REES-ECKER: ABNORMAL FL
POTASSIUM SERPL-SCNC: 4.4 MMOL/L (ref 3.5–5.3)
PROT SERPL-MCNC: 6.3 G/DL (ref 6.1–8.1)
RBC # BLD AUTO: 5.03 MILLION/UL (ref 3.8–5.1)
SODIUM SERPL-SCNC: 140 MMOL/L (ref 135–146)
TIBC SERPL-MCNC: 352 MCG/DL (CALC) (ref 250–450)
TSH SERPL-ACNC: 0.83 MIU/L
WBC # BLD AUTO: 4.4 THOUSAND/UL (ref 3.8–10.8)

## 2025-04-09 ENCOUNTER — INITIAL PRENATAL (OUTPATIENT)
Dept: MATERNAL FETAL MEDICINE | Facility: CLINIC | Age: 30
End: 2025-04-09
Payer: COMMERCIAL

## 2025-04-09 DIAGNOSIS — Z31.69 ENCOUNTER FOR PRECONCEPTION CONSULTATION: Primary | ICD-10-CM

## 2025-04-09 DIAGNOSIS — T80.919A ERYTHROCYTE ALLOIMMUNIZATION: ICD-10-CM

## 2025-04-09 PROCEDURE — 99215 OFFICE O/P EST HI 40 MIN: CPT | Mod: 95 | Performed by: OBSTETRICS & GYNECOLOGY

## 2025-04-09 PROCEDURE — 99215 OFFICE O/P EST HI 40 MIN: CPT | Performed by: OBSTETRICS & GYNECOLOGY

## 2025-04-09 NOTE — PROGRESS NOTES
"2025   Vonda Francis \"Vonda Francis\"     Virtual or Telephone Consent    Due to connectivity issues it was not possible to connect via audio/video telehealth technology; therefore, I performed this visit using a real-time audio only connection between Vonda Francis & Chavez Coulter MD.  Verbal consent was requested and obtained from Vonda Francis on this date, 25 for a telehealth visit and the patient's location was confirmed at the time of the visit.      M CONSULT NOTE  Referring Clinician: Jerica Blanco  Reason for consult: Anti-E antibodies    HPI: Vonda Francis is a 29 y.o.  at here for preconception consult     Doing well without complaints.    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  She has a history of prior pregnancies, the first complicated by GDM and gHTN.  The second was notable for significant anemia and had blood transfusion postpartum for her significant anemia.  She was found to have positive anti-E antibodies last  (no titer available) and had a screen on 3/10 that was negative.  Her prior pregnancies are fathered by the same individual than the planned pregnancy vias IVF.    10 point review of system is negative except as above    OB History  OB History    Para Term  AB Living   2 2 2 0 0 2   SAB IAB Ectopic Multiple Live Births   0 0 0 1 2      # Outcome Date GA Lbr Jesse/2nd Weight Sex Type Anes PTL Lv   2A Term 02/15/24 38w4d 10:05 / 01:03 3.75 kg M Vag-Spont EPI N HERNESTO      Name: BRIDGET FRANCIS      Apgar1: 9  Apgar5: 9   2B             1 Term 22 38w3d  3.629 kg M Vag-Spont EPI N HERNESTO      Complications: Gestational hypertension ([facility]), Gestational diabetes, New Site product of IVF pregnancy ([facility])      Name: CHAVEZ       Medical History  Past Medical History:   Diagnosis Date    Acid reflux     Acute hypoxic respiratory failure 10/09/2024    Aspiration into respiratory tract 10/09/2024    During " dilation esophagus under sedation    Class 1 obesity with body mass index (BMI) of 30.0 to 30.9 in adult 10/18/2024    Eosinophilic esophagitis 10/29/2024    GI    Esophageal dysphagia 10/29/2024    Female infertility     Gastroesophageal reflux disease, unspecified whether esophagitis present     Gestational diabetes     Gestational hypertension ([facility])     History of chlamydia infection 09/12/2016    History of folliculitis 08/01/2016    History of paronychia of finger 07/16/2015    History of vitamin D deficiency     Hypothyroid     Iron deficiency anemia     Odynophagia     Other nail disorders 08/01/2016    Nail deformity    Plantar wart 08/01/2016    Plantar wart, left foot       Surgical History  Past Surgical History:   Procedure Laterality Date    ESOPHAGEAL DILATION  10/09/2024    ESOPHAGOGASTRODUODENOSCOPY  10/09/2024    SALPINGECTOMY Bilateral        Family History  family history includes Asthma in her father, mother, and sister; Bipolar disorder in her mother; Bone cancer in her maternal grandfather; CERVICAL CARCINOMA in her mother; Depression in her mother and sister; Diabetes in her maternal grandfather and mother; Hyperlipidemia in her mother; Hypertension in her mother; Lung cancer in her maternal grandfather.    Social History  Social History     Tobacco Use    Smoking status: Former     Types: Cigarettes     Passive exposure: Past    Smokeless tobacco: Never   Vaping Use    Vaping status: Never Used   Substance Use Topics    Alcohol use: Not Currently    Drug use: Never       Allergies  Allergies   Allergen Reactions    Penicillins Hives and Unknown       Medications:  Medication Documentation Review Audit       Reviewed by Kirti Gibbons MD (Physician) on 03/24/25 at 1448      Medication Order Taking? Sig Documenting Provider Last Dose Status   acetaminophen (Tylenol) 160 mg/5 mL liquid [id number] Yes Take 650 mg by mouth every 4 hours if needed for mild pain (1 - 3). Historical Provider, MD  Past Week Active   budesonide (Pulmicort Flexhaler) 180 mcg/actuation inhaler [id number] Yes Inhale 1 puff 2 times a day. Spray on mouth then  swallow. rinse mouth with water after use to reduce aftertaste and incidence of candidiasis. Joselito De La Cruz MD  Active   estradiol (Estrace) 2 mg tablet [id number] Yes Take 3 tablets (6 mg) by mouth once daily. Lashawn Lake MD Unknown Active    Patient not taking:   Discontinued 25 1448   omeprazole (PriLOSEC) 40 mg DR capsule [id number] Yes Take 1 capsule (40 mg) by mouth 2 times a day. Do not crush or chew. Joselito De La Cruz MD Past Month Active                    OBJECTIVE  Visit Vitals  OB Status Recent pregnancy   Smoking Status Former       Physical exam  Gen: NAD  HEENT: EOMI, CN2-12 intact  Pulm: non-labored    ASSESSMENT & PLAN    Vonda Francis is a 29 y.o.  at Unknown here for the followin. Anti-E antibodies.   I reviewed with the potential for severe erythroblastosis that can be associated with antibodies to E antigen.   Alloimmunization occurs after exposure to an antigen mismatched blood transfusion or feto-maternal hemorrhage.   In order for the fetus to develop hemolytic disease of the fetus and  (HDFN), the fetus needs to express the E antigen.  In addition, the antibody needs to be in a high enough titer (typically considered 1:16).   It is assumed she developed the current antibody from either her prior pregnancy or transfusion.  The risk of the fetus inheriting the E antigen is dependent on the paternal genotype.  I took the liberty of ordering antigen testing for her partner, Lele Francis today (also present during visit, name and  confirmed) and reviewed the surveillance required if there is a potential for fetal risk including monthly Ab titers during pregnancy and screening by MCA dopplers every 1-2 weeks at 16 weeks if a titer of 1:16 or greater is noted at any point.    In summary the following is  recommended:    Antigen screening pattern for E antigen as above.  If positive will update recommendations for pregnancy surveillance.  If negative then no further follow up is required.    Thank you for allowing us to participate in the care of your patient.  At this time I see no contraindication to pregnancy and the above testing deos not need to be finalized before proceeding with pregnancy.    I spent 60 minutes in the professional and overall care of this patient.    Chavez Coulter MD  Maternal Fetal Medicine

## 2025-04-09 NOTE — LETTER
"2025     Jerica Blanco, APRN-CNP  [address]  [facility], [place], [address]  [facility] [address]    Patient: Vonda Francis   YOB: 1995   Date of Visit: 2025       Dear Dr. Jerica Blanco APRN-CNP:    Thank you for referring Vonda Francis to me for evaluation. Below are my notes for this consultation.  If you have questions, please do not hesitate to call me. I look forward to following your patient along with you.       Sincerely,     Chavez Coulter MD      CC: No Recipients  ______________________________________________________________________________________    2025   Vonda Francis \"Vonda Francis\"     Virtual or Telephone Consent    Due to connectivity issues it was not possible to connect via audio/video telehealth technology; therefore, I performed this visit using a real-time audio only connection between Vonda STORM Penny & Chavez Coulter MD.  Verbal consent was requested and obtained from Vonda Francis on this date, 25 for a telehealth visit and the patient's location was confirmed at the time of the visit.      [facility] CONSULT NOTE  Referring Clinician: Jerica Blanco  Reason for consult: Anti-E antibodies    HPI: Vonda Francis is a 29 y.o.  at here for preconception consult     Doing well without complaints.    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  She has a history of prior pregnancies, the first complicated by GDM and gHTN.  The second was notable for significant anemia and had blood transfusion postpartum for her significant anemia.  She was found to have positive anti-E antibodies last  (no titer available) and had a screen on 3/10 that was negative.  Her prior pregnancies are fathered by the same individual than the planned pregnancy vias IVF.    10 point review of system is negative except as above    OB History  OB History    Para Term  AB Living "   2 2 2 0 0 2   SAB IAB Ectopic Multiple Live Births   0 0 0 1 2      # Outcome Date GA Lbr Jesse/2nd Weight Sex Type Anes PTL Lv   2A Term 02/15/24 38w4d 10:05 / 01:03 3.75 kg M Vag-Spont EPI N HERNESTO      Name: BRIDGET MCKEE      Apgar1: 9  Apgar5: 9   2B             1 Term 22 38w3d  3.629 kg M Vag-Spont EPI N HERNESTO      Complications: Gestational hypertension ([facility]), Gestational diabetes, [place] product of IVF pregnancy ([facility])      Name: RAMYA       Medical History  Past Medical History:   Diagnosis Date   • Acid reflux    • Acute hypoxic respiratory failure 10/09/2024   • Aspiration into respiratory tract 10/09/2024    During dilation esophagus under sedation   • Class 1 obesity with body mass index (BMI) of 30.0 to 30.9 in adult 10/18/2024   • Eosinophilic esophagitis 10/29/2024    GI   • Esophageal dysphagia 10/29/2024   • Female infertility    • Gastroesophageal reflux disease, unspecified whether esophagitis present    • Gestational diabetes    • Gestational hypertension ([facility])    • History of chlamydia infection 2016   • History of folliculitis 2016   • History of paronychia of finger 2015   • History of vitamin D deficiency    • Hypothyroid    • Iron deficiency anemia    • Odynophagia    • Other nail disorders 2016    Nail deformity   • Plantar wart 2016    Plantar wart, left foot       Surgical History  Past Surgical History:   Procedure Laterality Date   • ESOPHAGEAL DILATION  10/09/2024   • ESOPHAGOGASTRODUODENOSCOPY  10/09/2024   • SALPINGECTOMY Bilateral        Family History  family history includes Asthma in her father, mother, and sister; Bipolar disorder in her mother; Bone cancer in her maternal grandfather; CERVICAL CARCINOMA in her mother; Depression in her mother and sister; Diabetes in her maternal grandfather and mother; Hyperlipidemia in her mother; Hypertension in her mother; Lung cancer in her maternal grandfather.    Social  History  Social History     Tobacco Use   • Smoking status: Former     Types: Cigarettes     Passive exposure: Past   • Smokeless tobacco: Never   Vaping Use   • Vaping status: Never Used   Substance Use Topics   • Alcohol use: Not Currently   • Drug use: Never       Allergies  Allergies   Allergen Reactions   • Penicillins Hives and Unknown       Medications:  Medication Documentation Review Audit       Reviewed by Kirti Gibbons MD (Physician) on 25 at 1448      Medication Order Taking? Sig Documenting Provider Last Dose Status   acetaminophen (Tylenol) 160 mg/5 mL liquid [id number] Yes Take 650 mg by mouth every 4 hours if needed for mild pain (1 - 3). Historical Provider, MD Past Week Active   budesonide (Pulmicort Flexhaler) 180 mcg/actuation inhaler [id number] Yes Inhale 1 puff 2 times a day. Spray on mouth then  swallow. rinse mouth with water after use to reduce aftertaste and incidence of candidiasis. Joselito De La Cruz MD  Active   estradiol (Estrace) 2 mg tablet [id number] Yes Take 3 tablets (6 mg) by mouth once daily. Lashawn Lake MD Unknown Active    Patient not taking:   Discontinued 25 1448   omeprazole (PriLOSEC) 40 mg DR capsule [id number] Yes Take 1 capsule (40 mg) by mouth 2 times a day. Do not crush or chew. Joselito De La Cruz MD Past Month Active                    OBJECTIVE  Visit Vitals  OB Status Recent pregnancy   Smoking Status Former       Physical exam  Gen: NAD  HEENT: EOMI, CN2-12 intact  Pulm: non-labored    ASSESSMENT & PLAN    Vonda Francis is a 29 y.o.  at Unknown here for the followin. Anti-E antibodies.   I reviewed with the potential for severe erythroblastosis that can be associated with antibodies to E antigen.   Alloimmunization occurs after exposure to an antigen mismatched blood transfusion or feto-maternal hemorrhage.   In order for the fetus to develop hemolytic disease of the fetus and  (HDFN), the fetus needs to express the E  antigen.  In addition, the antibody needs to be in a high enough titer (typically considered 1:16).   It is assumed she developed the current antibody from either her prior pregnancy or transfusion.  The risk of the fetus inheriting the E antigen is dependent on the paternal genotype.  I took the liberty of ordering antigen testing for her partner, Lele Francis today (also present during visit, name and  confirmed) and reviewed the surveillance required if there is a potential for fetal risk including monthly Ab titers during pregnancy and screening by MCA dopplers every 1-2 weeks at 16 weeks if a titer of 1:16 or greater is noted at any point.    In summary the following is recommended:    Antigen screening pattern for E antigen as above.  If positive will update recommendations for pregnancy surveillance.  If negative then no further follow up is required.    Thank you for allowing us to participate in the care of your patient.  At this time I see no contraindication to pregnancy and the above testing deos not need to be finalized before proceeding with pregnancy.    I spent 60 minutes in the professional and overall care of this patient.    Chavez Coulter MD  Maternal Fetal Medicine

## 2025-04-14 ENCOUNTER — TELEPHONE (OUTPATIENT)
Dept: ENDOCRINOLOGY | Facility: CLINIC | Age: 30
End: 2025-04-14
Payer: COMMERCIAL

## 2025-04-14 DIAGNOSIS — Z31.83 ENCOUNTER FOR ASSISTED REPRODUCTIVE FERTILITY CYCLE: ICD-10-CM

## 2025-04-14 DIAGNOSIS — N97.9 FEMALE INFERTILITY: ICD-10-CM

## 2025-04-14 NOTE — TELEPHONE ENCOUNTER
Reason for call: reporting start of cycle  LMP: 04/13  Treatment type: IVF   Note: pt wants a call back for next steps. Pt also stated that she wants to talk about her cycle that she started for the second time this month

## 2025-04-14 NOTE — TELEPHONE ENCOUNTER
Patient called with menses for FET setup. Had menses end of march. Had menses early this month and ended on 4/8 (very light and dark). Menses started again 4/13 and this seems like normal menses.   LMP: 4/13/25  Protocol: Programmed : estrace 6mg  LETA 75mg IM   Would prefer a female for transfer- Dr. Messina, Dr. Loza, Dr. Vargas.- history of trauma.   Tentative lining check: 4/29/25  Tentative progesterone start: 5/1/24  Tentative transfer date: 5/6/24 with Dr. Vargas   Number of days of progesterone for transfer: 6  Treatment plan confirmed: signed 10/25/24  In waiver confirmed: signed 10/25/24  Embryo # confirmed: TBD  Location of Embryo: TBD  Embryo # to transfer: 2 confirmed with Dr. Messina on 10/7/24    Boarding pass signed off:  Yes by Dr. Phillip on 4/14/25    Mini Flores  04/14/2025  4:02 PM     Spoke with patient regarding above plan. Patient added to huddle for tomorrow for fet set up. Patient sent [facility] contact information. Patient added to IVF start calendar. Patient instructed to start estrace 6 mg daily today and continue until instructed otherwise. Patient instructed both estrace and LETA (when instructed to start) are continued up to 10w6d of pregnancy. Patient instructed to stay after lining check to go over LETA with nurse and have injection sites marked. Patient instructed to reach out to [facility] provider regarding testing and further recommendations for pregnancy surveillance per Dr. Phillip. Patient agreeable and denies further questions/concerns.   MINI FLORES on 4/14/25 at 4:45 PM.    [Mother] : mother [FreeTextEntry1] : [place] patient apt. Premature NICU. Discharge from hospital yesterday

## 2025-04-15 ENCOUNTER — TELEPHONE (OUTPATIENT)
Dept: ENDOCRINOLOGY | Facility: CLINIC | Age: 30
End: 2025-04-15

## 2025-04-15 ENCOUNTER — PHARMACY VISIT (OUTPATIENT)
Dept: PHARMACY | Facility: CLINIC | Age: 30
End: 2025-04-15
Payer: COMMERCIAL

## 2025-04-15 ENCOUNTER — TELEPHONE (OUTPATIENT)
Dept: ENDOCRINOLOGY | Facility: CLINIC | Age: 30
End: 2025-04-15
Payer: COMMERCIAL

## 2025-04-15 ENCOUNTER — SPECIALTY PHARMACY (OUTPATIENT)
Dept: PHARMACY | Facility: CLINIC | Age: 30
End: 2025-04-15

## 2025-04-15 DIAGNOSIS — N97.9 FEMALE INFERTILITY: ICD-10-CM

## 2025-04-15 PROCEDURE — RXMED WILLOW AMBULATORY MEDICATION CHARGE

## 2025-04-15 RX ORDER — LIDOCAINE AND PRILOCAINE 25; 25 MG/G; MG/G
CREAM TOPICAL DAILY
Qty: 30 G | Refills: 2 | Status: SHIPPED | OUTPATIENT
Start: 2025-04-15 | End: 2026-04-15

## 2025-04-15 RX ORDER — PROPYLENE GLYCOL/PEG 400 0.3 %-0.4%
DROPS OPHTHALMIC (EYE)
Qty: 30 EACH | Refills: 3 | Status: SHIPPED | OUTPATIENT
Start: 2025-04-15 | End: 2025-07-13

## 2025-04-15 RX ORDER — ESTRADIOL 2 MG/1
6 TABLET ORAL DAILY
Qty: 90 TABLET | Refills: 2 | Status: SHIPPED | OUTPATIENT
Start: 2025-04-15 | End: 2026-04-15

## 2025-04-15 RX ORDER — ESTRADIOL 2 MG/1
2 TABLET ORAL 2 TIMES DAILY
Start: 2025-04-15 | End: 2026-04-15

## 2025-04-15 RX ORDER — PROGESTERONE 50 MG/ML
75 INJECTION, SOLUTION INTRAMUSCULAR DAILY
Qty: 50 ML | Refills: 3 | Status: SHIPPED | OUTPATIENT
Start: 2025-04-15 | End: 2026-04-15

## 2025-04-15 NOTE — TELEPHONE ENCOUNTER
----- Message from Chavez Coulter sent at 4/14/2025  5:31 PM EDT -----  No worries!    I already ordered it for him. So I'll close th loop as soon as its done.  But either way it doesn't;t need to hold up any pregnancy plans.    Rick  ----- Message -----  From: Fred Phillip MD  Sent: 4/14/2025   4:04 PM EDT  To: Chavez Coulter MD; Razia Flores RN    Hi Rick, thanks for seeing this patient.  It looks like you recommended ordering antigen screening patter for E antigen in your consult, but that's not something that I've ordered before in this system.  Do you know how to go about getting that testing set up?  Thanks    Geovanny

## 2025-04-15 NOTE — PROGRESS NOTES
Patient called with menses for FET setup. Had menses end of march. Had menses early this month and ended on 4/8 (very light, dark). Menses started again 4/13 and this seems like normal menses.   LMP: 4/13/25  Protocol: Programmed : estrace 6mg  LETA 75mg IM   Would prefer a female for transfer- Dr. Messina, Dr. Loza, Dr. Vargas.- history of trauma.   Tentative lining check: 4/29/25  Tentative progesterone start: 5/1/24  Tentative transfer date: 5/6/24 with Dr. Vargas   Number of days of progesterone for transfer: 6  Treatment plan confirmed: signed 10/25/24  In waiver confirmed: signed 10/25/24  Embryo # confirmed: 6 untested  Location of Embryo:   Embryo # to transfer: 2 confirmed with Dr. Messina on 10/7/24     Boarding pass signed off:  Yes by Dr. Phillip on 4/14/25    Mini Flores  04/15/2025  10:48 AM     Will bring in for baseline tomorrow    Lashawn Lake 04/15/25 1:02 PM    Plan sent via my chart. Message sent to  for scheduling.   MINI FLORES on 4/15/25 at 1:55 PM.

## 2025-04-15 NOTE — TELEPHONE ENCOUNTER
Returned patient call regarding transfer date. Explained to patient that transfer date is tentative for now and will not be confirmed until lining check. Patient agreeable and denies further questions/concerns.   MINI GARCIA on 4/15/25 at 3:12 PM.

## 2025-04-16 ENCOUNTER — ANCILLARY PROCEDURE (OUTPATIENT)
Dept: ENDOCRINOLOGY | Facility: CLINIC | Age: 30
End: 2025-04-16

## 2025-04-16 ENCOUNTER — TELEPHONE (OUTPATIENT)
Dept: ENDOCRINOLOGY | Facility: CLINIC | Age: 30
End: 2025-04-16
Payer: COMMERCIAL

## 2025-04-16 ENCOUNTER — LAB REQUISITION (OUTPATIENT)
Dept: LAB | Facility: HOSPITAL | Age: 30
End: 2025-04-16
Payer: COMMERCIAL

## 2025-04-16 DIAGNOSIS — N97.9 FEMALE INFERTILITY: ICD-10-CM

## 2025-04-16 DIAGNOSIS — N97.9 FEMALE INFERTILITY, UNSPECIFIED: ICD-10-CM

## 2025-04-16 LAB
ESTRADIOL SERPL-MCNC: 153 PG/ML
PROGEST SERPL-MCNC: 0.3 NG/ML

## 2025-04-16 PROCEDURE — 82670 ASSAY OF TOTAL ESTRADIOL: CPT

## 2025-04-16 PROCEDURE — 84144 ASSAY OF PROGESTERONE: CPT

## 2025-04-16 PROCEDURE — 76857 US EXAM PELVIC LIMITED: CPT

## 2025-04-16 NOTE — TELEPHONE ENCOUNTER
----- Message from Lorna Arriaza sent at 4/16/2025 10:59 AM EDT -----  Called  patient regarding coverage.Reviewed coverage and pricing. Estimates sent to [employer].  ----- Message -----  From: Mini Flores RN  Sent: 4/16/2025  10:47 AM EDT  To: Chapo Finch    Would someone be able to call this patient to discuss payments? She was very confused why she had to pay up front for her visit today and just wanted to confirm when payments will be due for future appointments. She is starting a transfer cycle. Thank you, MINI FLORES on 4/16/25 at 10:47 AM.

## 2025-04-16 NOTE — PROGRESS NOTES
CYCLING NOTE    Here for US and/or lab monitoring; relevant findings reviewed. Patient is 29 years old. Patient of Dr. Phillip. Patient is here for baseline for FET Cycle #4 (previous cycle cancelled). Patient states she has had abnormal bleeding this past month. Patient states she started normal menses end of March but then started bleeding again a few days later (very light/dark bleeding) which lasted until 4/8. Patient states she started bleeding again on 4/13 and this was like a normal bleed for her (patient reports hx of abnormal cycles). Patient was instructed by team to return for baseline today to confirm following FET plan:     Tentative lining check: 4/29/25  Tentative progesterone start: 5/1/24  Tentative transfer date: 5/6/24 with Dr. Vargas     Patient stayed for nurse visit. Pain is 0/10  Team will contact patient later today with results and plan.    Mini Flores  04/16/2025  11:16 AM    My chart message sent to patient with plan from meeting. Patient already scheduled for 4/29/25.   MINI FLORES on 4/16/25 at 1:46 PM.

## 2025-04-24 ENCOUNTER — APPOINTMENT (OUTPATIENT)
Dept: GASTROENTEROLOGY | Facility: CLINIC | Age: 30
End: 2025-04-24
Payer: COMMERCIAL

## 2025-04-28 DIAGNOSIS — N97.9 FEMALE INFERTILITY: Primary | ICD-10-CM

## 2025-04-29 ENCOUNTER — SPECIALTY PHARMACY (OUTPATIENT)
Dept: PHARMACY | Facility: CLINIC | Age: 30
End: 2025-04-29

## 2025-04-29 ENCOUNTER — ANCILLARY PROCEDURE (OUTPATIENT)
Dept: ENDOCRINOLOGY | Facility: CLINIC | Age: 30
End: 2025-04-29

## 2025-04-29 ENCOUNTER — PHARMACY VISIT (OUTPATIENT)
Dept: PHARMACY | Facility: CLINIC | Age: 30
End: 2025-04-29
Payer: COMMERCIAL

## 2025-04-29 ENCOUNTER — LAB REQUISITION (OUTPATIENT)
Dept: LAB | Facility: HOSPITAL | Age: 30
End: 2025-04-29
Payer: COMMERCIAL

## 2025-04-29 DIAGNOSIS — N97.9 FEMALE INFERTILITY, UNSPECIFIED: ICD-10-CM

## 2025-04-29 DIAGNOSIS — N97.9 FEMALE INFERTILITY: ICD-10-CM

## 2025-04-29 LAB
ESTRADIOL SERPL-MCNC: 332 PG/ML
LH SERPL-ACNC: 10.1 IU/L
PROGEST SERPL-MCNC: 5.2 NG/ML

## 2025-04-29 PROCEDURE — 76857 US EXAM PELVIC LIMITED: CPT | Performed by: OBSTETRICS & GYNECOLOGY

## 2025-04-29 PROCEDURE — 83002 ASSAY OF GONADOTROPIN (LH): CPT

## 2025-04-29 PROCEDURE — 84144 ASSAY OF PROGESTERONE: CPT

## 2025-04-29 PROCEDURE — RXMED WILLOW AMBULATORY MEDICATION CHARGE

## 2025-04-29 PROCEDURE — 82670 ASSAY OF TOTAL ESTRADIOL: CPT

## 2025-04-29 PROCEDURE — 76857 US EXAM PELVIC LIMITED: CPT

## 2025-04-29 RX ORDER — LEUPROLIDE ACETATE 1 MG/0.2ML
10 KIT SUBCUTANEOUS EVERY EVENING
Qty: 1 KIT | Refills: 0 | Status: SHIPPED | OUTPATIENT
Start: 2025-04-29

## 2025-04-29 NOTE — PROGRESS NOTES
[facility] PATIENT NOTE  Pharmacist Consultation    Patient was identified as an individual who may benefit from additional counseling and education regarding their prescription for Leuprolide Acetate 1mg/0.2mL injection.     Patient was contacted by [facility] via telephone. Requested for me to send her written instructions via [employer]- sent 4/29/25 right after phone call. Reviewed her dose is 10 units on an insulin syringe in the lower belly area.   Patient was encouraged to contact the Fertility Clinical Pharmacy Specialist, Pharmacy Support Liaison, or the general line for [facility] with any questions or concerns.    All patient questions and concerns were addressed to the best of my ability. Patient verbalized understanding of the information reviewed.      Bria Murray (Katie), PharmRUBIN  [facility]  Clinical Pharmacy Specialist- Fertility   [facility], [place] [address]  Email: [email or web address]  Tel: [phone]       Fax: [phone]

## 2025-04-29 NOTE — PROGRESS NOTES
CYCLING NOTE    Here for US and/or lab monitoring; relevant findings reviewed. Patient is 29 years old. Patient of Dr. Phillip. Patient is here for lining check for FET Cycle #4 (previous cycle cancelled). Patient states she has had abnormal bleeding this past month. Patient states she started normal menses end of March but then started bleeding again a few days later (very light/dark bleeding) which lasted until 4/8. Patient states she started bleeding again on 4/13 and this was like a normal bleed for her (patient reports hx of abnormal cycles). Patient was instructed by team to return for baseline 4/16/25 to confirm following FET plan:      Tentative lining check: 4/29/25  Tentative progesterone start: 5/1/24  Tentative transfer date: 5/6/24 with Dr. Vargas     Patient stayed for nurse visit. Pain is 0/10  Team will contact patient later today with results and plan.    Mini Flores  04/29/2025  9:53 AM      ====  Given elevated p4 and appearance of ovulation on ultrasound (+FF and CL) will have to cancel this transfer cycle.   Will plan to start on lupron now and bring back in to rebaseline with menses    Lashawn Lake 04/29/25 1:30 PM    Spoke with patient regarding above plan as well as sent my chart. Patient instructed to call [facility] asap for Lupron. BP routed for update. Patient agreeable and denies further questions/concerns.   MINI FLORES on 4/29/25 at 2:42 PM.

## 2025-05-12 ENCOUNTER — TELEPHONE (OUTPATIENT)
Dept: ENDOCRINOLOGY | Facility: CLINIC | Age: 30
End: 2025-05-12
Payer: COMMERCIAL

## 2025-05-12 DIAGNOSIS — N97.9 FEMALE INFERTILITY: ICD-10-CM

## 2025-05-12 NOTE — TELEPHONE ENCOUNTER
Returned patient call regarding LMP- 5/9/25. Patient had cancelled FET cycle on 4/29/25 given elevated p4 and appearance of ovulation on ultrasound (+FF and CL). Patient was instructed to start Lupron for a Lupron FET 10 units daily and RTC with menses for baseline. Patient confirmed she has been taking Lupron 10 units daily. Patient instructed to continue Lupron 10 units this evening and RTC tomorrow for baseline. Patient instructed if baseline looks good we will plan to have her re start estrace and drop Lupron to 10 units and discuss new transfer dates. Patient agreeable and denies further questions/concerns. Patient transferred to front to schedule appointment.   MINI GARCIA on 5/12/25 at 11:25 AM.

## 2025-05-13 ENCOUNTER — LAB REQUISITION (OUTPATIENT)
Dept: LAB | Facility: HOSPITAL | Age: 30
End: 2025-05-13
Payer: COMMERCIAL

## 2025-05-13 ENCOUNTER — ANCILLARY PROCEDURE (OUTPATIENT)
Dept: ENDOCRINOLOGY | Facility: CLINIC | Age: 30
End: 2025-05-13

## 2025-05-13 DIAGNOSIS — N97.9 FEMALE INFERTILITY: ICD-10-CM

## 2025-05-13 DIAGNOSIS — N97.9 FEMALE INFERTILITY, UNSPECIFIED: ICD-10-CM

## 2025-05-13 LAB
ESTRADIOL SERPL-MCNC: <20 PG/ML
PROGEST SERPL-MCNC: 0.5 NG/ML

## 2025-05-13 PROCEDURE — 76857 US EXAM PELVIC LIMITED: CPT | Performed by: OBSTETRICS & GYNECOLOGY

## 2025-05-13 PROCEDURE — 84144 ASSAY OF PROGESTERONE: CPT

## 2025-05-13 PROCEDURE — 82670 ASSAY OF TOTAL ESTRADIOL: CPT

## 2025-05-13 PROCEDURE — 76857 US EXAM PELVIC LIMITED: CPT

## 2025-05-13 NOTE — PROGRESS NOTES
CYCLING NOTE    Here for US and/or lab monitoring; relevant findings reviewed.    Cycle #: FET #5 (previous 2 cycles cancelled)   Reason For Treatment: history of bilateral salpingectomy, hx of ghtn and gDM .   Protocol: UPDATED PROTOCOL (4/29/2025):  Lupron FET  Estrace 6 mg  LETA 75 mg IM  Patient Hx: 29 year old patient of Dr. Phillip.   Notes: patient has hx of abnormal bleeding. Patient had abnormal bleeding with last fet cycle and was cancelled on lining check on 4/29 d/t elevated p4 and appearance of ovulation on ultrasound (+FF and CL). Lupron was started at that time for Lupron lead in into Lupron FET and plan to RTC for baseline for Lupron FET. Patient is here today for a baseline for this cycle. Patient started menses on 5/9 and has not yet dropped Lupron dose/started estrace. See tentative FET set up below.      Patient called with menses for FET setup.   LMP: 5/9/25, if we drop Lupron today and start estrace:   Protocol: UPDATED PROTOCOL (4/29/2025):  Lupron FET  Estrace 6 mg  LETA 75 mg IM  Tentative lining check: 5/27/25  Tentative progesterone start: 5/29/25  Tentative transfer date: 6/3/25 with Dr. Vargas   Number of days of progesterone for transfer: 6  Treatment plan confirmed: signed on 10/25/25  In waiver confirmed: signed on 10/25/25  Embryo # confirmed: 6 untested  Location of Embryo:   Embryo # to transfer: 2    Boarding pass signed off:  Yes by Dr. Phillip on 4/29/25    Razia Flores  05/13/2025  7:21 AM     Patient did not stay for discussion after monitoring,  Team will contact patient later today with results and plan.    Razia Flores  05/13/2025  7:21 AM    Lashawn Lake 05/13/25 1:16 PM    Spoke with patient regarding above plan as well as sent patient a my chart message with plan. Patient educated follicle seen on scan is not active and we can proceed. Patient agreeable and denies further questions/concerns. Patient will call back to schedule lining check.   RADHA  MINI on 5/13/25 at 2:48 PM.

## 2025-05-22 ENCOUNTER — APPOINTMENT (OUTPATIENT)
Dept: GASTROENTEROLOGY | Facility: CLINIC | Age: 30
End: 2025-05-22
Payer: COMMERCIAL

## 2025-05-25 DIAGNOSIS — N97.9 FEMALE INFERTILITY: ICD-10-CM

## 2025-05-27 ENCOUNTER — LAB REQUISITION (OUTPATIENT)
Dept: LAB | Facility: HOSPITAL | Age: 30
End: 2025-05-27
Payer: COMMERCIAL

## 2025-05-27 ENCOUNTER — ANCILLARY PROCEDURE (OUTPATIENT)
Dept: ENDOCRINOLOGY | Facility: CLINIC | Age: 30
End: 2025-05-27

## 2025-05-27 DIAGNOSIS — N97.9 FEMALE INFERTILITY, UNSPECIFIED: ICD-10-CM

## 2025-05-27 DIAGNOSIS — N97.9 FEMALE INFERTILITY: ICD-10-CM

## 2025-05-27 LAB
ESTRADIOL SERPL-MCNC: 382 PG/ML
PROGEST SERPL-MCNC: 0.5 NG/ML

## 2025-05-27 PROCEDURE — 84144 ASSAY OF PROGESTERONE: CPT

## 2025-05-27 PROCEDURE — 76857 US EXAM PELVIC LIMITED: CPT

## 2025-05-27 PROCEDURE — 82670 ASSAY OF TOTAL ESTRADIOL: CPT

## 2025-05-27 RX ORDER — LEUPROLIDE ACETATE 1 MG/0.2ML
10 KIT SUBCUTANEOUS EVERY EVENING
Qty: 1 KIT | Refills: 0 | Status: SHIPPED | OUTPATIENT
Start: 2025-05-27

## 2025-05-27 NOTE — PROGRESS NOTES
CYCLING NOTE    Here for US and/or lab monitoring; relevant findings reviewed.    Cycle #: FET #5 (previous 2 cycles cancelled)   Reason For Treatment: history of bilateral salpingectomy, hx of ghtn and gDM .   Protocol: UPDATED PROTOCOL (4/29/2025):  Lupron FET  Estrace 6 mg  LETA 75 mg IM  Patient Hx: 29 year old patient of Dr. Phillip.   Notes: patient has hx of abnormal bleeding. Patient had abnormal bleeding with last fet cycle and was cancelled on lining check on 4/29 d/t elevated p4 and appearance of ovulation on ultrasound (+FF and CL). Lupron was started at that time for Lupron lead in into Lupron FET and plan to RTC for baseline for Lupron FET. Patient started menses on 5/9. Patient was in for baseline on 5/13/25 and was instructed to continue Lupron 10 units and start estrace. Patinet back today for lining check. Patient to tentatively start LETA on 5/29/25 with tentative transfer on 6/3/25 with Dr. Vargas. Patient running low on Lupron (only has enough for tonight).     Patient stayed for nurse visit. Pain is 0/10  Team will contact patient later today with results and plan.    Mini Flores  05/27/2025  10:46 AM    Attempted to reach patient with plan. Detailed VM as well as my chart message sent to patient with plan. Patient instructed to call office back with questions/concerns. Patient scheduled on her way out this AM.   MINI FLORES on 5/27/25 at 1:51 PM.

## 2025-05-28 DIAGNOSIS — N97.9 FEMALE INFERTILITY: ICD-10-CM

## 2025-05-29 ENCOUNTER — ANCILLARY PROCEDURE (OUTPATIENT)
Dept: ENDOCRINOLOGY | Facility: CLINIC | Age: 30
End: 2025-05-29

## 2025-05-29 ENCOUNTER — LAB REQUISITION (OUTPATIENT)
Dept: LAB | Facility: HOSPITAL | Age: 30
End: 2025-05-29
Payer: COMMERCIAL

## 2025-05-29 ENCOUNTER — TELEPHONE (OUTPATIENT)
Dept: ENDOCRINOLOGY | Facility: CLINIC | Age: 30
End: 2025-05-29

## 2025-05-29 DIAGNOSIS — N97.9 FEMALE INFERTILITY: ICD-10-CM

## 2025-05-29 DIAGNOSIS — N97.9 FEMALE INFERTILITY, UNSPECIFIED: ICD-10-CM

## 2025-05-29 LAB
ESTRADIOL SERPL-MCNC: 2514 PG/ML
PROGEST SERPL-MCNC: 0.8 NG/ML

## 2025-05-29 PROCEDURE — 82670 ASSAY OF TOTAL ESTRADIOL: CPT

## 2025-05-29 PROCEDURE — 76857 US EXAM PELVIC LIMITED: CPT

## 2025-05-29 PROCEDURE — 84144 ASSAY OF PROGESTERONE: CPT

## 2025-05-29 NOTE — TELEPHONE ENCOUNTER
Reason for call: Patient was here for Lining check today and is calling to talk to a nurse has some questions.  Notes:

## 2025-05-29 NOTE — PROGRESS NOTES
CYCLING NOTE    Here for US and/or lab monitoring; relevant findings reviewed.    Cycle #: FET #5 (previous 2 cycles cancelled) repeat lining check   Reason For Treatment: history of bilateral salpingectomy, hx of ghtn and gDM .   Protocol: UPDATED PROTOCOL (4/29/2025):  Lupron FET  Estrace 6 mg  LETA 75 mg IM  Patient Hx: 29 year old patient of Dr. Phillip.   Notes: patient has hx of abnormal bleeding. Patient had abnormal bleeding with last fet cycle and was cancelled on lining check on 4/29 d/t elevated p4 and appearance of ovulation on ultrasound (+FF and CL). Lupron was started at that time for Lupron lead in into Lupron FET and plan to RTC for baseline for Lupron FET. Patient started menses on 5/9. Patient was in for baseline on 5/13/25 and was instructed to continue Lupron 10 units and start estrace. Luisanet presented 5/27 and was instructed to start vaginal estrace and RTC today for repeat lining check. Patient to tentatively start LETA TODAY 5/29/25 with tentative transfer on 6/3/25 with Dr. Vargas.    Patients plan was reviewed in clinician chat and patient was confirmed. LETA was started in office.     Patient stayed for nurse visit. Pain is 0/10  Team will contact patient later today with results and plan.    Mini Flores  05/29/2025  8:20 AM    Spoke with patient regarding plan from meeting as well as sent plan via my chart. Patient agreeable and denies further questions/concerns.   MINI FLORES on 5/29/25 at 2:38 PM.

## 2025-06-02 ENCOUNTER — PREP FOR PROCEDURE (OUTPATIENT)
Dept: ENDOCRINOLOGY | Facility: CLINIC | Age: 30
End: 2025-06-02
Payer: COMMERCIAL

## 2025-06-02 DIAGNOSIS — N97.9 FEMALE INFERTILITY: ICD-10-CM

## 2025-06-02 RX ORDER — ACETAMINOPHEN 325 MG/1
650 TABLET ORAL ONCE AS NEEDED
Status: CANCELLED | OUTPATIENT
Start: 2025-06-02

## 2025-06-03 ENCOUNTER — LAB REQUISITION (OUTPATIENT)
Dept: LAB | Facility: HOSPITAL | Age: 30
End: 2025-06-03
Payer: COMMERCIAL

## 2025-06-03 ENCOUNTER — APPOINTMENT (OUTPATIENT)
Dept: ENDOCRINOLOGY | Facility: CLINIC | Age: 30
End: 2025-06-03
Payer: COMMERCIAL

## 2025-06-03 ENCOUNTER — HOSPITAL ENCOUNTER (OUTPATIENT)
Dept: ENDOCRINOLOGY | Facility: CLINIC | Age: 30
Discharge: HOME | End: 2025-06-03

## 2025-06-03 DIAGNOSIS — N97.9 FEMALE INFERTILITY, UNSPECIFIED: ICD-10-CM

## 2025-06-03 DIAGNOSIS — Z32.00 ENCOUNTER FOR PREGNANCY TEST, RESULT UNKNOWN: Primary | ICD-10-CM

## 2025-06-03 DIAGNOSIS — Z31.83 ENCOUNTER FOR ASSISTED REPRODUCTIVE FERTILITY CYCLE: ICD-10-CM

## 2025-06-03 LAB — PROGEST SERPL-MCNC: 27 NG/ML

## 2025-06-03 PROCEDURE — 58974 EMBRYO TRANSFER INTRAUTERINE: CPT | Performed by: OBSTETRICS & GYNECOLOGY

## 2025-06-03 PROCEDURE — 89255 PREPARE EMBRYO FOR TRANSFER: CPT | Performed by: OBSTETRICS & GYNECOLOGY

## 2025-06-03 PROCEDURE — 84144 ASSAY OF PROGESTERONE: CPT

## 2025-06-03 PROCEDURE — 89352 THAWING CRYOPRESRVED EMBRYO: CPT | Performed by: OBSTETRICS & GYNECOLOGY

## 2025-06-03 PROCEDURE — 89253 EMBRYO HATCHING: CPT | Performed by: OBSTETRICS & GYNECOLOGY

## 2025-06-03 RX ORDER — ACETAMINOPHEN 325 MG/1
650 TABLET ORAL ONCE AS NEEDED
Status: DISCONTINUED | OUTPATIENT
Start: 2025-06-03 | End: 2025-06-04 | Stop reason: HOSPADM

## 2025-06-03 NOTE — DISCHARGE INSTRUCTIONS
[facility]  [address]. [place]  [address]  Tel: [phone]   Fax: [phone]    Home Going Instructions after Embryo Transfer:     After completing your embryo transfer please treat your body as though you are pregnant.  No smoking, alcohol, or recreational drugs.  Make sure you are taking a prenatal vitamin daily.   Continue all medications currently prescribed for embryo transfer until otherwise instructed by your physician, even if you experience spotting or bleeding and even if you have a negative home pregnancy test.   Medications to avoid in pregnancy: Advil, Motrin, Ibuprofen, Aleve, Excedrin, Noni-Jefferson, Sudafed, and Pepto-Bismol. Avoid aspirin unless you are taking this daily at the direction of your physician.   Medications that are generally safe in pregnancy: Tylenol, Benadryl, Plain Robitussin, Zyrtec, Claritin, Pepcid, Tums, Rolaids, Mylanta, Nasonex.   Foods to avoid:   Seafood that is high in mercury; you can have canned tuna twice a week.   Deli meats, deli salads, hot dogs, and unpasteurized cheeses due to the risk of Listeria.  Activities to avoid:   No hot tubs, whirlpools, or saunas.  Avoid starting new exercise routines that you have not done previously.  Avoid lifting > 30 lbs.  No cleaning litter boxes.  No intercourse until you test for pregnancy.   You do not need to be on bed rest following the transfer. It is healthy, normal, and recommended to go about routine physical activity.   We advise against taking a home pregnancy test due to the possibility of false negative and false positive results.   You will test for pregnancy in approximately 10 days, at which point you will be about 4 weeks pregnant.   If blood work was drawn on the day of your transfer, you can expect a phone that day with the results of your bloodwork. We expect a progesterone level greater than or equal to 16. If you level is less than 16  we will adjust your progesterone dose and repeat your level in 2 days.     Layla Govea    11:03 AM

## 2025-06-03 NOTE — PROGRESS NOTES
"Patient ID: Vonda Francis \"Vonda Francis\" is a 29 y.o. female.    Embryo Transfer    Date/Time: 6/3/2025 11:47 AM    Performed by: Samaria Vargas MD  Authorized by: ABDULAZIZ Vo-CNP    Consent:     Consent obtained:  Written    Consent given by:  Patient    Procedure risks and benefits discussed: yes      Patient questions answered: yes      Patient agrees, verbalizes understanding, and wants to proceed: yes      Educational handouts given: yes      Instructions and paperwork completed: yes    Procedure:     Pelvic exam performed: No      Cervix cleaned and prepped: Yes      Speculum placed in vagina: Yes      Ultrasound guidance: Yes      Catheter type:  Sure View Gardner    Difficulty: easy      Estimated blood loss:  None  Post-procedure:     Patient tolerated procedure well: yes    Comments:      Preop diagnosis: Infertility  Post op diagnosis: Same  Assistant: Jaya  Depth: 7 cm  Curve: 15  Distance from fundus: 12 mm  Embryo stage at day of transfer: day 5 x 2  Embryo notes: 3AA x 2  PGT: Not performed  Anesthesia: None    IV Fluids: None  UOP: Not recorded  Specimens: None  Complications: None    Attending Attestation: I was physically present for key and critical portions performed by the fellow. I reviewed the fellow's documentation and discussed the patient with the fellow. I agree with the fellow's medical decision making as documented in the fellow's note.   Samaria Vargas 06/03/25 11:47 AM            "

## 2025-06-13 ENCOUNTER — LAB REQUISITION (OUTPATIENT)
Dept: LAB | Facility: HOSPITAL | Age: 30
End: 2025-06-13
Payer: COMMERCIAL

## 2025-06-13 ENCOUNTER — TELEPHONE (OUTPATIENT)
Dept: ENDOCRINOLOGY | Facility: CLINIC | Age: 30
End: 2025-06-13

## 2025-06-13 DIAGNOSIS — Z32.00 ENCOUNTER FOR PREGNANCY TEST, RESULT UNKNOWN: ICD-10-CM

## 2025-06-13 DIAGNOSIS — Z01.812 ENCOUNTER FOR PREPROCEDURAL LABORATORY EXAMINATION: ICD-10-CM

## 2025-06-13 LAB — B-HCG SERPL-ACNC: <2 MIU/ML

## 2025-06-13 PROCEDURE — 84702 CHORIONIC GONADOTROPIN TEST: CPT

## 2025-06-13 NOTE — PROGRESS NOTES
Patient HCG negative today s/p FET on 6/3, of two blasts.   Primary MD notified, will call patient with results.     Jeny Urban  06/13/2025  7:54 AM  ===    [facility] PROVIDER NOTE - HCG REVIEW  Ultrasound and/or labs reviewed at [facility].     Recent Results (from the past 24 hours)   Human Chorionic Gonadotropin, Serum Quantitative    Collection Time: 06/13/25  9:43 AM   Result Value Ref Range    HCG, Beta-Quantitative <2 <5 mIU/mL       Lab Results   Component Value Date    HCGQUANT <2 06/13/2025    HCGQUANT 10,003 (A) 06/28/2023    HCGQUANT 857 (A) 06/21/2023    HCGQUANT 364 (A) 06/19/2023    HCGQUANT <3 04/18/2023    HCGQUANT <3 03/14/2023    HCGQUANT 163,828.0 (HH) 06/13/2021    HCGQUANT 16,020 (A) 05/17/2021       Dr Phillip notified    Lashawn Lake  06/13/2025  1:13 PM    Phone call to patient to discuss results of blood pregnancy test. Unfortunately HCG today is negative. Offered support to patient. Reviewed to stop medications and expect menses within the next week.  She verbalizes understanding and is agreeable.    Jeny Urban 06/13/25 1:55 PM

## 2025-06-13 NOTE — TELEPHONE ENCOUNTER
Patient calling back to clarify if she should stop her medications.  Let her know since her HCG is negative we will have her stop her medications.    Jeny Urban 06/13/25 3:03 PM

## 2025-06-17 DIAGNOSIS — D50.8 IRON DEFICIENCY ANEMIA SECONDARY TO INADEQUATE DIETARY IRON INTAKE: ICD-10-CM

## 2025-06-17 DIAGNOSIS — N30.00 ACUTE CYSTITIS WITHOUT HEMATURIA: Primary | ICD-10-CM

## 2025-06-23 ENCOUNTER — TELEPHONE (OUTPATIENT)
Dept: PRIMARY CARE | Facility: CLINIC | Age: 30
End: 2025-06-23
Payer: COMMERCIAL

## 2025-06-23 DIAGNOSIS — R39.9 UTI SYMPTOMS: Primary | ICD-10-CM

## 2025-06-24 RX ORDER — SULFAMETHOXAZOLE AND TRIMETHOPRIM 800; 160 MG/1; MG/1
1 TABLET ORAL 2 TIMES DAILY
Qty: 6 TABLET | Refills: 0 | Status: SHIPPED | OUTPATIENT
Start: 2025-06-24 | End: 2025-06-27

## 2025-06-25 DIAGNOSIS — R39.9 UTI SYMPTOMS: Primary | ICD-10-CM

## 2025-06-26 LAB
APPEARANCE UR: ABNORMAL
BACTERIA #/AREA URNS HPF: ABNORMAL /HPF
BACTERIA UR CULT: ABNORMAL
BACTERIA UR CULT: ABNORMAL
BILIRUB UR QL STRIP: NEGATIVE
COLOR UR: YELLOW
GLUCOSE UR QL STRIP: NEGATIVE
HGB UR QL STRIP: ABNORMAL
HYALINE CASTS #/AREA URNS LPF: ABNORMAL /LPF
KETONES UR QL STRIP: ABNORMAL
LEUKOCYTE ESTERASE UR QL STRIP: ABNORMAL
NITRITE UR QL STRIP: POSITIVE
PH UR STRIP: 6 [PH] (ref 5–8)
PROT UR QL STRIP: ABNORMAL
RBC #/AREA URNS HPF: ABNORMAL /HPF
SERVICE CMNT-IMP: ABNORMAL
SP GR UR STRIP: 1.02 (ref 1–1.03)
SQUAMOUS #/AREA URNS HPF: ABNORMAL /HPF
WBC #/AREA URNS HPF: ABNORMAL /HPF

## 2025-08-04 ENCOUNTER — OFFICE VISIT (OUTPATIENT)
Dept: PRIMARY CARE | Facility: CLINIC | Age: 30
End: 2025-08-04
Payer: COMMERCIAL

## 2025-08-04 VITALS
HEART RATE: 101 BPM | SYSTOLIC BLOOD PRESSURE: 122 MMHG | DIASTOLIC BLOOD PRESSURE: 64 MMHG | OXYGEN SATURATION: 99 % | WEIGHT: 165.4 LBS | BODY MASS INDEX: 28.79 KG/M2

## 2025-08-04 DIAGNOSIS — H69.93 DYSFUNCTION OF BOTH EUSTACHIAN TUBES: Primary | ICD-10-CM

## 2025-08-04 DIAGNOSIS — H92.01 RIGHT EAR PAIN: ICD-10-CM

## 2025-08-04 PROCEDURE — 99213 OFFICE O/P EST LOW 20 MIN: CPT | Performed by: FAMILY MEDICINE

## 2025-08-04 RX ORDER — METHYLPREDNISOLONE 4 MG/1
TABLET ORAL
Qty: 1 EACH | Refills: 0 | Status: SHIPPED | OUTPATIENT
Start: 2025-08-04

## 2025-08-04 ASSESSMENT — PAIN SCALES - GENERAL: PAINLEVEL_OUTOF10: 5

## 2025-08-04 ASSESSMENT — PATIENT HEALTH QUESTIONNAIRE - PHQ9
2. FEELING DOWN, DEPRESSED OR HOPELESS: NOT AT ALL
1. LITTLE INTEREST OR PLEASURE IN DOING THINGS: NOT AT ALL
SUM OF ALL RESPONSES TO PHQ9 QUESTIONS 1 AND 2: 0

## 2025-08-04 NOTE — PROGRESS NOTES
Last Clinic Visit: 6-2-2020:  Impression and Recommendations (Patient Counseled on the Following):  1. Plaque type psoriasis, <10%TBSA, no PsA - stable  -Continue nbUVB (home 2x/week, clinic 1x/week)  -Lidex 0.05% solution for scalp BID PRN  -Protopic 0.1% around eyes BID PRN  -Taclonex ointment to affected areas BID PRN     Subjective   Patient ID: Vonda Francis is a 29 y.o. female who presents for Earache.    HPI   Pt comes in with right ear pain that started 4-5 days ago. Started around the time the weather dropped from 90 to 50-70 degrees.  No airplane trips, high altitude trips.  No recent URI.  No swimming.    Medical History[1]  Current Medications[2]    Review of Systems see hpi    Objective   /64   Pulse 101   Wt 75 kg (165 lb 6.4 oz)   SpO2 99%   Breastfeeding No   BMI 28.79 kg/m²     Physical Exam  Vitals reviewed.   Constitutional:       Appearance: Normal appearance.   HENT:      Right Ear: Ear canal and external ear normal. Tympanic membrane is bulging.      Left Ear: Ear canal and external ear normal. Tympanic membrane is bulging.     Neurological:      Mental Status: She is alert.         Assessment/Plan   Assessment & Plan  Dysfunction of both eustachian tubes  Once done with steroids, get steroid NS otc like flonase sensimist, nasacort, nasonex, rhinocort and cont for 1-2 wks.   Orders:    methylPREDNISolone (Medrol Dospak) 4 mg tablets; Take each day's dose once a day in the AM with food    Right ear pain    Orders:    methylPREDNISolone (Medrol Dospak) 4 mg tablets; Take each day's dose once a day in the AM with food                  [1]   Past Medical History:  Diagnosis Date    Acid reflux     Acute hypoxic respiratory failure 10/09/2024    Aspiration into respiratory tract 10/09/2024    During dilation esophagus under sedation    Class 1 obesity with body mass index (BMI) of 30.0 to 30.9 in adult 10/18/2024    Eosinophilic esophagitis 10/29/2024    GI    Esophageal dysphagia 10/29/2024    Female infertility     Gastroesophageal reflux disease, unspecified whether esophagitis present     Gestational diabetes     Gestational hypertension ([facility])     History of chlamydia infection 09/12/2016    History of folliculitis 08/01/2016    History of paronychia of finger 07/16/2015    History of  vitamin D deficiency     Hypothyroid     Iron deficiency anemia     Odynophagia     Other nail disorders 08/01/2016    Nail deformity    Plantar wart 08/01/2016    Plantar wart, left foot   [2]   Current Outpatient Medications   Medication Sig Dispense Refill    acetaminophen (Tylenol) 160 mg/5 mL liquid Take 650 mg by mouth every 4 hours if needed for mild pain (1 - 3).      budesonide (Pulmicort Flexhaler) 180 mcg/actuation inhaler Inhale 1 puff 2 times a day. Spray on mouth then  swallow. rinse mouth with water after use to reduce aftertaste and incidence of candidiasis. 60 each 1    omeprazole (PriLOSEC) 40 mg DR capsule Take 1 capsule (40 mg) by mouth 2 times a day. Do not crush or chew. 60 capsule 3    methylPREDNISolone (Medrol Dospak) 4 mg tablets Take each day's dose once a day in the AM with food 1 each 0     No current facility-administered medications for this visit.

## 2025-08-06 ENCOUNTER — APPOINTMENT (OUTPATIENT)
Dept: ALLERGY | Facility: CLINIC | Age: 30
End: 2025-08-06
Payer: COMMERCIAL

## 2025-09-05 ENCOUNTER — CONSULT (OUTPATIENT)
Dept: ALLERGY | Facility: CLINIC | Age: 30
End: 2025-09-05
Payer: COMMERCIAL

## 2025-09-05 VITALS
HEART RATE: 76 BPM | SYSTOLIC BLOOD PRESSURE: 112 MMHG | DIASTOLIC BLOOD PRESSURE: 76 MMHG | OXYGEN SATURATION: 98 % | BODY MASS INDEX: 28.38 KG/M2 | WEIGHT: 163 LBS

## 2025-09-05 DIAGNOSIS — K20.0 EOSINOPHILIC ESOPHAGITIS: ICD-10-CM

## 2025-09-05 DIAGNOSIS — J45.30 MILD PERSISTENT ASTHMA WITHOUT COMPLICATION (HHS-HCC): Primary | ICD-10-CM

## 2025-09-05 PROCEDURE — 95004 PERQ TESTS W/ALRGNC XTRCS: CPT | Performed by: ALLERGY & IMMUNOLOGY

## 2025-09-05 PROCEDURE — 99204 OFFICE O/P NEW MOD 45 MIN: CPT | Performed by: ALLERGY & IMMUNOLOGY

## 2025-09-05 RX ORDER — FLUTICASONE PROPIONATE 220 UG/1
2 AEROSOL, METERED RESPIRATORY (INHALATION)
Qty: 12 G | Refills: 11 | Status: SHIPPED | OUTPATIENT
Start: 2025-09-05 | End: 2026-09-05